# Patient Record
Sex: FEMALE | Race: AMERICAN INDIAN OR ALASKA NATIVE | NOT HISPANIC OR LATINO | Employment: UNEMPLOYED | URBAN - METROPOLITAN AREA
[De-identification: names, ages, dates, MRNs, and addresses within clinical notes are randomized per-mention and may not be internally consistent; named-entity substitution may affect disease eponyms.]

---

## 2019-02-19 PROBLEM — Z34.02 ENCOUNTER FOR SUPERVISION OF NORMAL FIRST PREGNANCY IN SECOND TRIMESTER: Status: ACTIVE | Noted: 2019-02-19

## 2019-02-19 PROBLEM — Z34.03 ENCOUNTER FOR SUPERVISION OF NORMAL FIRST PREGNANCY IN THIRD TRIMESTER: Status: ACTIVE | Noted: 2019-02-19

## 2019-06-25 PROBLEM — Z34.03 ENCOUNTER FOR SUPERVISION OF NORMAL FIRST PREGNANCY IN THIRD TRIMESTER: Status: RESOLVED | Noted: 2019-02-19 | Resolved: 2019-06-25

## 2020-02-12 ENCOUNTER — HOSPITAL ENCOUNTER (INPATIENT)
Facility: MEDICAL CENTER | Age: 25
LOS: 3 days | DRG: 419 | End: 2020-02-15
Attending: INTERNAL MEDICINE | Admitting: HOSPITALIST

## 2020-02-12 ENCOUNTER — HOSPITAL ENCOUNTER (OUTPATIENT)
Facility: MEDICAL CENTER | Age: 25
End: 2020-02-12
Admitting: INTERNAL MEDICINE

## 2020-02-12 ENCOUNTER — HOSPITAL ENCOUNTER (EMERGENCY)
Facility: MEDICAL CENTER | Age: 25
End: 2020-02-12

## 2020-02-12 VITALS — HEIGHT: 67 IN | BODY MASS INDEX: 25.64 KG/M2 | WEIGHT: 163.36 LBS

## 2020-02-12 DIAGNOSIS — K80.50 CHOLEDOCHOLITHIASIS: ICD-10-CM

## 2020-02-12 PROCEDURE — 770001 HCHG ROOM/CARE - MED/SURG/GYN PRIV*

## 2020-02-12 PROCEDURE — 99222 1ST HOSP IP/OBS MODERATE 55: CPT | Performed by: HOSPITALIST

## 2020-02-12 RX ORDER — HYDROMORPHONE HYDROCHLORIDE 1 MG/ML
0.5 INJECTION, SOLUTION INTRAMUSCULAR; INTRAVENOUS; SUBCUTANEOUS
Status: DISCONTINUED | OUTPATIENT
Start: 2020-02-12 | End: 2020-02-15 | Stop reason: HOSPADM

## 2020-02-12 RX ORDER — ONDANSETRON 2 MG/ML
4 INJECTION INTRAMUSCULAR; INTRAVENOUS EVERY 4 HOURS PRN
Status: DISCONTINUED | OUTPATIENT
Start: 2020-02-12 | End: 2020-02-15 | Stop reason: HOSPADM

## 2020-02-12 RX ORDER — BISACODYL 10 MG
10 SUPPOSITORY, RECTAL RECTAL
Status: DISCONTINUED | OUTPATIENT
Start: 2020-02-12 | End: 2020-02-15 | Stop reason: HOSPADM

## 2020-02-12 RX ORDER — PROMETHAZINE HYDROCHLORIDE 25 MG/1
12.5-25 SUPPOSITORY RECTAL EVERY 4 HOURS PRN
Status: DISCONTINUED | OUTPATIENT
Start: 2020-02-12 | End: 2020-02-15 | Stop reason: HOSPADM

## 2020-02-12 RX ORDER — ONDANSETRON 4 MG/1
4 TABLET, ORALLY DISINTEGRATING ORAL EVERY 4 HOURS PRN
Status: DISCONTINUED | OUTPATIENT
Start: 2020-02-12 | End: 2020-02-15 | Stop reason: HOSPADM

## 2020-02-12 RX ORDER — ACETAMINOPHEN 325 MG/1
650 TABLET ORAL EVERY 6 HOURS PRN
Status: DISCONTINUED | OUTPATIENT
Start: 2020-02-12 | End: 2020-02-15 | Stop reason: HOSPADM

## 2020-02-12 RX ORDER — ZOLPIDEM TARTRATE 5 MG/1
5 TABLET ORAL NIGHTLY PRN
Status: DISCONTINUED | OUTPATIENT
Start: 2020-02-12 | End: 2020-02-13 | Stop reason: HOSPADM

## 2020-02-12 RX ORDER — OXYCODONE HYDROCHLORIDE 10 MG/1
10 TABLET ORAL
Status: DISCONTINUED | OUTPATIENT
Start: 2020-02-12 | End: 2020-02-15 | Stop reason: HOSPADM

## 2020-02-12 RX ORDER — PROMETHAZINE HYDROCHLORIDE 25 MG/1
12.5-25 TABLET ORAL EVERY 4 HOURS PRN
Status: DISCONTINUED | OUTPATIENT
Start: 2020-02-12 | End: 2020-02-15 | Stop reason: HOSPADM

## 2020-02-12 RX ORDER — PROCHLORPERAZINE EDISYLATE 5 MG/ML
5-10 INJECTION INTRAMUSCULAR; INTRAVENOUS EVERY 4 HOURS PRN
Status: DISCONTINUED | OUTPATIENT
Start: 2020-02-12 | End: 2020-02-15 | Stop reason: HOSPADM

## 2020-02-12 RX ORDER — LEVOFLOXACIN 5 MG/ML
500 INJECTION, SOLUTION INTRAVENOUS
Status: DISCONTINUED | OUTPATIENT
Start: 2020-02-12 | End: 2020-02-13 | Stop reason: HOSPADM

## 2020-02-12 RX ORDER — ONDANSETRON 2 MG/ML
4 INJECTION INTRAMUSCULAR; INTRAVENOUS EVERY 4 HOURS PRN
Status: DISCONTINUED | OUTPATIENT
Start: 2020-02-12 | End: 2020-02-13 | Stop reason: HOSPADM

## 2020-02-12 RX ORDER — OXYCODONE HYDROCHLORIDE 5 MG/1
5 TABLET ORAL
Status: DISCONTINUED | OUTPATIENT
Start: 2020-02-12 | End: 2020-02-15 | Stop reason: HOSPADM

## 2020-02-12 RX ORDER — AMOXICILLIN 250 MG
2 CAPSULE ORAL 2 TIMES DAILY
Status: DISCONTINUED | OUTPATIENT
Start: 2020-02-12 | End: 2020-02-15 | Stop reason: HOSPADM

## 2020-02-12 RX ORDER — SODIUM CHLORIDE, SODIUM LACTATE, POTASSIUM CHLORIDE, CALCIUM CHLORIDE 600; 310; 30; 20 MG/100ML; MG/100ML; MG/100ML; MG/100ML
INJECTION, SOLUTION INTRAVENOUS CONTINUOUS
Status: DISCONTINUED | OUTPATIENT
Start: 2020-02-12 | End: 2020-02-15 | Stop reason: HOSPADM

## 2020-02-12 RX ORDER — FAMOTIDINE 20 MG/1
20 TABLET, FILM COATED ORAL
COMMUNITY
End: 2021-02-28

## 2020-02-12 RX ORDER — POLYETHYLENE GLYCOL 3350 17 G/17G
1 POWDER, FOR SOLUTION ORAL
Status: DISCONTINUED | OUTPATIENT
Start: 2020-02-12 | End: 2020-02-15 | Stop reason: HOSPADM

## 2020-02-12 RX ORDER — DEXTROSE MONOHYDRATE, SODIUM CHLORIDE, AND POTASSIUM CHLORIDE 50; 1.49; 4.5 G/1000ML; G/1000ML; G/1000ML
1000 INJECTION, SOLUTION INTRAVENOUS CONTINUOUS
Status: DISCONTINUED | OUTPATIENT
Start: 2020-02-12 | End: 2020-02-13 | Stop reason: HOSPADM

## 2020-02-12 ASSESSMENT — LIFESTYLE VARIABLES
EVER_SMOKED: NEVER
ALCOHOL_USE: NO
EVER FELT BAD OR GUILTY ABOUT YOUR DRINKING: NO
HOW MANY TIMES IN THE PAST YEAR HAVE YOU HAD 5 OR MORE DRINKS IN A DAY: 0
ON A TYPICAL DAY WHEN YOU DRINK ALCOHOL HOW MANY DRINKS DO YOU HAVE: 0
TOTAL SCORE: 0
HAVE YOU EVER FELT YOU SHOULD CUT DOWN ON YOUR DRINKING: NO
HAVE PEOPLE ANNOYED YOU BY CRITICIZING YOUR DRINKING: NO
TOTAL SCORE: 0
CONSUMPTION TOTAL: NEGATIVE
EVER HAD A DRINK FIRST THING IN THE MORNING TO STEADY YOUR NERVES TO GET RID OF A HANGOVER: NO
AVERAGE NUMBER OF DAYS PER WEEK YOU HAVE A DRINK CONTAINING ALCOHOL: 0
TOTAL SCORE: 0

## 2020-02-12 ASSESSMENT — COGNITIVE AND FUNCTIONAL STATUS - GENERAL
SUGGESTED CMS G CODE MODIFIER DAILY ACTIVITY: CH
SUGGESTED CMS G CODE MODIFIER MOBILITY: CH
DAILY ACTIVITIY SCORE: 24
MOBILITY SCORE: 24

## 2020-02-12 ASSESSMENT — PATIENT HEALTH QUESTIONNAIRE - PHQ9
SUM OF ALL RESPONSES TO PHQ9 QUESTIONS 1 AND 2: 0
1. LITTLE INTEREST OR PLEASURE IN DOING THINGS: NOT AT ALL
2. FEELING DOWN, DEPRESSED, IRRITABLE, OR HOPELESS: NOT AT ALL

## 2020-02-13 ENCOUNTER — HOSPITAL ENCOUNTER (OUTPATIENT)
Dept: RADIOLOGY | Facility: MEDICAL CENTER | Age: 25
End: 2020-02-13

## 2020-02-13 PROBLEM — K80.50 CHOLEDOCHOLITHIASIS: Status: ACTIVE | Noted: 2020-02-13

## 2020-02-13 LAB
ALBUMIN SERPL BCP-MCNC: 4.3 G/DL (ref 3.2–4.9)
ALBUMIN/GLOB SERPL: 1.3 G/DL
ALP SERPL-CCNC: 187 U/L (ref 30–99)
ALT SERPL-CCNC: 207 U/L (ref 2–50)
ANION GAP SERPL CALC-SCNC: 14 MMOL/L (ref 0–11.9)
AST SERPL-CCNC: 425 U/L (ref 12–45)
BASOPHILS # BLD AUTO: 0.1 % (ref 0–1.8)
BASOPHILS # BLD: 0.01 K/UL (ref 0–0.12)
BILIRUB SERPL-MCNC: 0.7 MG/DL (ref 0.1–1.5)
BUN SERPL-MCNC: 11 MG/DL (ref 8–22)
CALCIUM SERPL-MCNC: 9.2 MG/DL (ref 8.4–10.2)
CHLORIDE SERPL-SCNC: 104 MMOL/L (ref 96–112)
CO2 SERPL-SCNC: 20 MMOL/L (ref 20–33)
CREAT SERPL-MCNC: 0.49 MG/DL (ref 0.5–1.4)
EOSINOPHIL # BLD AUTO: 0.07 K/UL (ref 0–0.51)
EOSINOPHIL NFR BLD: 0.7 % (ref 0–6.9)
ERYTHROCYTE [DISTWIDTH] IN BLOOD BY AUTOMATED COUNT: 44.5 FL (ref 35.9–50)
GLOBULIN SER CALC-MCNC: 3.3 G/DL (ref 1.9–3.5)
GLUCOSE SERPL-MCNC: 86 MG/DL (ref 65–99)
HCT VFR BLD AUTO: 41.5 % (ref 37–47)
HGB BLD-MCNC: 13.2 G/DL (ref 12–16)
IMM GRANULOCYTES # BLD AUTO: 0.04 K/UL (ref 0–0.11)
IMM GRANULOCYTES NFR BLD AUTO: 0.4 % (ref 0–0.9)
LYMPHOCYTES # BLD AUTO: 1.62 K/UL (ref 1–4.8)
LYMPHOCYTES NFR BLD: 17.2 % (ref 22–41)
MCH RBC QN AUTO: 27 PG (ref 27–33)
MCHC RBC AUTO-ENTMCNC: 31.8 G/DL (ref 33.6–35)
MCV RBC AUTO: 84.9 FL (ref 81.4–97.8)
MONOCYTES # BLD AUTO: 1.07 K/UL (ref 0–0.85)
MONOCYTES NFR BLD AUTO: 11.3 % (ref 0–13.4)
NEUTROPHILS # BLD AUTO: 6.62 K/UL (ref 2–7.15)
NEUTROPHILS NFR BLD: 70.3 % (ref 44–72)
NRBC # BLD AUTO: 0 K/UL
NRBC BLD-RTO: 0 /100 WBC
PLATELET # BLD AUTO: 317 K/UL (ref 164–446)
PMV BLD AUTO: 10.1 FL (ref 9–12.9)
POTASSIUM SERPL-SCNC: 3.9 MMOL/L (ref 3.6–5.5)
PROT SERPL-MCNC: 7.6 G/DL (ref 6–8.2)
RBC # BLD AUTO: 4.89 M/UL (ref 4.2–5.4)
SODIUM SERPL-SCNC: 138 MMOL/L (ref 135–145)
WBC # BLD AUTO: 9.4 K/UL (ref 4.8–10.8)

## 2020-02-13 PROCEDURE — A9270 NON-COVERED ITEM OR SERVICE: HCPCS | Performed by: HOSPITALIST

## 2020-02-13 PROCEDURE — 36415 COLL VENOUS BLD VENIPUNCTURE: CPT

## 2020-02-13 PROCEDURE — 770001 HCHG ROOM/CARE - MED/SURG/GYN PRIV*

## 2020-02-13 PROCEDURE — 80053 COMPREHEN METABOLIC PANEL: CPT

## 2020-02-13 PROCEDURE — 700111 HCHG RX REV CODE 636 W/ 250 OVERRIDE (IP): Performed by: HOSPITALIST

## 2020-02-13 PROCEDURE — 700105 HCHG RX REV CODE 258: Performed by: HOSPITALIST

## 2020-02-13 PROCEDURE — 99233 SBSQ HOSP IP/OBS HIGH 50: CPT | Performed by: INTERNAL MEDICINE

## 2020-02-13 PROCEDURE — 700102 HCHG RX REV CODE 250 W/ 637 OVERRIDE(OP): Performed by: HOSPITALIST

## 2020-02-13 PROCEDURE — 85025 COMPLETE CBC W/AUTO DIFF WBC: CPT

## 2020-02-13 RX ORDER — KETOROLAC TROMETHAMINE 30 MG/ML
30 INJECTION, SOLUTION INTRAMUSCULAR; INTRAVENOUS EVERY 6 HOURS PRN
Status: DISCONTINUED | OUTPATIENT
Start: 2020-02-13 | End: 2020-02-14

## 2020-02-13 RX ADMIN — KETOROLAC TROMETHAMINE 30 MG: 30 INJECTION, SOLUTION INTRAMUSCULAR at 19:46

## 2020-02-13 RX ADMIN — SODIUM CHLORIDE, POTASSIUM CHLORIDE, SODIUM LACTATE AND CALCIUM CHLORIDE: 600; 310; 30; 20 INJECTION, SOLUTION INTRAVENOUS at 00:22

## 2020-02-13 RX ADMIN — SENNOSIDES AND DOCUSATE SODIUM 2 TABLET: 8.6; 5 TABLET ORAL at 17:03

## 2020-02-13 RX ADMIN — ACETAMINOPHEN 650 MG: 325 TABLET, FILM COATED ORAL at 18:01

## 2020-02-13 RX ADMIN — OXYCODONE HYDROCHLORIDE 10 MG: 10 TABLET ORAL at 21:24

## 2020-02-13 RX ADMIN — PROCHLORPERAZINE EDISYLATE 5 MG: 5 INJECTION INTRAMUSCULAR; INTRAVENOUS at 20:45

## 2020-02-13 ASSESSMENT — ENCOUNTER SYMPTOMS
SHORTNESS OF BREATH: 0
PSYCHIATRIC NEGATIVE: 1
DEPRESSION: 0
WEIGHT LOSS: 0
CONSTIPATION: 0
RESPIRATORY NEGATIVE: 1
HEADACHES: 0
DIZZINESS: 0
HEARTBURN: 0
COUGH: 0
BRUISES/BLEEDS EASILY: 0
ABDOMINAL PAIN: 1
WEAKNESS: 0
CONSTITUTIONAL NEGATIVE: 1
MYALGIAS: 0
HEARTBURN: 1
CARDIOVASCULAR NEGATIVE: 1
BACK PAIN: 0
VOMITING: 0
CLAUDICATION: 0
PHOTOPHOBIA: 0
CHILLS: 0
SORE THROAT: 0
FLANK PAIN: 0
MUSCULOSKELETAL NEGATIVE: 1
SPEECH CHANGE: 0
LOSS OF CONSCIOUSNESS: 0
BLURRED VISION: 0
NEUROLOGICAL NEGATIVE: 1
SENSORY CHANGE: 0
DIARRHEA: 0
NAUSEA: 0
NERVOUS/ANXIOUS: 0
INSOMNIA: 0
FEVER: 0

## 2020-02-13 NOTE — PROGRESS NOTES
Hospital Medicine Daily Progress Note    Date of Service  2/13/2020    Chief Complaint  24 y.o. female admitted 2/12/2020 with abdominal pain.    Hospital Course    [place] [facility] [place]CP showed choledocholithiasis.  She is still breast feeding her son who was born in May 2019.  She was sent to [facility] as [facility] [place]'t have ERCP available at their facility.  GI, Dr Duong and surgery- Dr Mackay have been consulted.      Interval Problem Update  Patient states she is feeling okay when examined, denied active pain and not requiring narcotics so she is continuing to breast feed her son at bedside.    Consultants/Specialty  GI - Rhoda  Surgery - Thompson    Code Status  full    Disposition  Home when appropriate.    Review of Systems  Review of Systems   Constitutional: Negative for chills and fever.   HENT: Negative for congestion and sore throat.    Eyes: Negative for blurred vision and photophobia.   Respiratory: Negative for cough and shortness of breath.    Cardiovascular: Negative for chest pain, claudication and leg swelling.   Gastrointestinal: Positive for abdominal pain (better). Negative for constipation, diarrhea, heartburn, nausea and vomiting.   Genitourinary: Negative for dysuria and hematuria.   Musculoskeletal: Negative for joint pain and myalgias.   Skin: Negative for itching and rash.   Neurological: Negative for dizziness, sensory change, speech change, weakness and headaches.   Psychiatric/Behavioral: Negative for depression. The patient is not nervous/anxious and does not have insomnia.         Physical Exam  Temp:  [37 °C (98.6 °F)] 37 °C (98.6 °F)  Pulse:  [83] 83  Resp:  [18] 18  BP: (128)/(81) 128/81  SpO2:  [96 %] 96 %    Physical Exam  Vitals signs and nursing note reviewed.   Constitutional:       General: She is not in acute distress.     Appearance: Normal appearance. She is not ill-appearing.   HENT:      Head: Normocephalic and atraumatic.      Nose: Nose normal.    Eyes:      General: No scleral icterus.  Neck:      Musculoskeletal: Neck supple.   Cardiovascular:      Rate and Rhythm: Normal rate and regular rhythm.      Heart sounds: Normal heart sounds. No murmur.   Pulmonary:      Effort: Pulmonary effort is normal.      Breath sounds: Normal breath sounds.   Abdominal:      General: Bowel sounds are normal. There is no distension.      Palpations: Abdomen is soft.   Musculoskeletal:         General: No swelling or tenderness.   Skin:     General: Skin is warm and dry.   Neurological:      General: No focal deficit present.      Mental Status: She is alert and oriented to person, place, and time.   Psychiatric:         Mood and Affect: Mood normal.         Fluids  No intake or output data in the 24 hours ending 02/13/20 1158    Laboratory  Recent Labs     02/12/20  1355 02/13/20  0225   WBC 8.8 9.4   RBC 5.20 4.89   HEMOGLOBIN 14.0 13.2   HEMATOCRIT 43.3 41.5   MCV 83.3 84.9   MCH 26.9* 27.0   MCHC 32.3* 31.8*   RDW 14.6* 44.5   PLATELETCT 356 317   MPV 9.8 10.1     Recent Labs     02/12/20  1355 02/13/20  0225   SODIUM 142 138   POTASSIUM 3.5 3.9   CHLORIDE 105 104   CO2 22 20   GLUCOSE 115* 86   BUN 15 11   CREATININE 0.8 0.49*   CALCIUM 9.6 9.2                   Imaging  OUTSIDE IMAGES-DX CHEST   Final Result      CT-FOREIGN FILM CAT SCAN   Final Result      US-FOREIGN FILM ULTRASOUND   Final Result      MR-FOREIGN FILM MRI   Final Result           Assessment/Plan  Patient is a currently breast-feeding mother  Assessment & Plan  Breast pump being ordered for patient.    Choledocholithiasis- (present on admission)  Assessment & Plan  Admit for GI evaluation, ERCP, Dr. Duong consulted.  Surgery has already been consulted Dr. Mackay will follow the patient.  Patient is breast-feeding,  I will order Toradol for pain unless severe.  She is written for narcotics advised her not to breast-feed after getting narcotics.  N.p.o. after midnight  Lactated Ringer's infusion at  75 cc/h.  She does not have evidence of cholecystitis therefore I am not giving her antibiotics.         VTE prophylaxis: juan antonios

## 2020-02-13 NOTE — CARE PLAN
Problem: Communication  Goal: The ability to communicate needs accurately and effectively will improve  Outcome: PROGRESSING AS EXPECTED     Problem: Safety  Goal: Will remain free from injury  Outcome: PROGRESSING AS EXPECTED     Problem: Bowel/Gastric:  Goal: Normal bowel function is maintained or improved  Outcome: PROGRESSING AS EXPECTED     Problem: Knowledge Deficit  Goal: Knowledge of disease process/condition, treatment plan, diagnostic tests, and medications will improve  Outcome: PROGRESSING AS EXPECTED

## 2020-02-13 NOTE — H&P
Hospital Medicine History & Physical Note    Date of Service  2/12/2020    Primary Care Physician  Pcp Pt States None    Consultants  GI, Dr. Duong  Surgery, Dr. Mackay    Code Status  Full code.    Chief Complaint  Upper abdominal.    History of Presenting Illness  24 y.o. female who presented 2/12/2020 with upper abdominal and chest pain.  [place] [facility] [place] [place] it was acute onset while she was driving.  The pain was absolutely severe and went through to her back.  Looking at her medical record, since her delivery of her son in May 2019 she has had 2 ER visits prior to this 1 with abdominal pain and vomiting, gallstones were seen but no cholecystitis and no obstruction.  Today she was very nauseated.  She went to [place] where they found that she had choledocholithiasis.  They do not have services for ERCP there and therefore she is transferred here for GI evaluation.    Review of Systems  Review of Systems   Constitutional: Negative.  Negative for chills, fever, malaise/fatigue and weight loss.   HENT: Negative.    Respiratory: Negative.  Negative for cough and shortness of breath.    Cardiovascular: Negative.  Negative for chest pain and leg swelling.   Gastrointestinal: Positive for abdominal pain. Negative for nausea and vomiting.   Genitourinary: Negative.  Negative for dysuria and flank pain.   Musculoskeletal: Negative.  Negative for back pain and myalgias.   Neurological: Negative.  Negative for dizziness, loss of consciousness and weakness.   Endo/Heme/Allergies: Negative.  Does not bruise/bleed easily.   Psychiatric/Behavioral: Negative.  Negative for depression. The patient is not nervous/anxious.    All other systems reviewed and are negative.      Past Medical History   has a past medical history of Scleroderma (HCC) (4/26/2013).    Surgical History  NOne     Family History  family history includes Arthritis in her mother. Her mother and aunt  both had gallstones.    Social History   reports that she has never smoked. She has never used smokeless tobacco. She reports that she does not drink alcohol or use drugs.    Allergies  No Known Allergies    Medications  None       Physical Exam  Temp:  [37 °C (98.6 °F)] 37 °C (98.6 °F)  Pulse:  [83] 83  Resp:  [18] 18  BP: (128)/(81) 128/81  SpO2:  [96 %] 96 %    Physical Exam  Vitals signs and nursing note reviewed.   Constitutional:       General: She is not in acute distress.     Appearance: She is well-developed. She is not diaphoretic.   HENT:      Right Ear: External ear normal.      Left Ear: External ear normal.      Nose: Nose normal.      Mouth/Throat:      Pharynx: No oropharyngeal exudate.   Eyes:      General: No scleral icterus.        Right eye: No discharge.         Left eye: No discharge.      Conjunctiva/sclera: Conjunctivae normal.   Neck:      Vascular: No JVD.      Trachea: No tracheal deviation.   Cardiovascular:      Rate and Rhythm: Normal rate and regular rhythm.      Heart sounds: Normal heart sounds.   Pulmonary:      Effort: Pulmonary effort is normal. No respiratory distress.      Breath sounds: Normal breath sounds. No stridor. No wheezing or rales.   Chest:      Chest wall: No tenderness.   Abdominal:      General: Bowel sounds are normal. There is no distension.      Palpations: Abdomen is soft.      Tenderness: There is abdominal tenderness (RUQ).   Musculoskeletal:         General: No tenderness.   Skin:     General: Skin is warm and dry.      Coloration: Skin is not pale.   Neurological:      General: No focal deficit present.      Mental Status: She is alert and oriented to person, place, and time.      Cranial Nerves: No cranial nerve deficit.      Motor: No abnormal muscle tone.   Psychiatric:         Mood and Affect: Mood normal.         Behavior: Behavior normal.         Thought Content: Thought content normal.         Judgment: Judgment normal.         Laboratory:  Recent  Labs     02/12/20  1355   WBC 8.8   RBC 5.20   HEMOGLOBIN 14.0   HEMATOCRIT 43.3   MCV 83.3   MCH 26.9*   MCHC 32.3*   RDW 14.6*   PLATELETCT 356   MPV 9.8     Recent Labs     02/12/20  1355   SODIUM 142   POTASSIUM 3.5   CHLORIDE 105   CO2 22   GLUCOSE 115*   BUN 15   CREATININE 0.8   CALCIUM 9.6     Recent Labs     02/12/20  1355   ALTSGPT 31   ASTSGOT 66*   ALKPHOSPHAT 128*   TBILIRUBIN 0.5   DBILIRUBIN <0.2   LIPASE 80   GLUCOSE 115*         No results for input(s): NTPROBNP in the last 72 hours.      No results for input(s): TROPONINT in the last 72 hours.    Urinalysis:    No results found     Imaging:  No orders to display         Assessment/Plan:  I anticipate this patient will require at least two midnights for appropriate medical management, necessitating inpatient admission.    Choledocholithiasis- (present on admission)  Assessment & Plan  Admit for GI evaluation, ERCP, Dr. Duong consulted.  Surgery has already been consulted Dr. Mackay will follow the patient.  Patient is breast-feeding,  I will order Toradol for pain unless severe.  She is written for narcotics advised her not to breast-feed after getting narcotics.  N.p.o. after midnight  Lactated Ringer's infusion at 75 cc/h.  She does not have evidence of cholecystitis therefore I am not giving her antibiotics.      VTE prophylaxis: SCDs.

## 2020-02-13 NOTE — ASSESSMENT & PLAN NOTE
Admit for GI evaluation, ERCP today, Dr. Lazar consulted.  Surgery had already been consulted Dr. Mackay saw the patient in [place] but since she was transferred to [place], will need to discuss with a local surgeon, Dr Regalado will consult.  Patient is breast-feeding,  Toradol for pain unless severe.  She is written for narcotics advised her not to breast-feed after getting narcotics.  Lactated Ringer's infusion at 75 cc/h.  She does not have evidence of cholecystitis therefore I am not giving her antibiotics.

## 2020-02-13 NOTE — PROGRESS NOTES
0700: Bedside report from Ruben MOHR RN. Pt wakes to voice. No needs at this time. Pending possible, pt breatsfeeding will order  Breastpump.

## 2020-02-13 NOTE — PROGRESS NOTES
Received ED to [place]patient [place] [facility]   Sending Physician: Trinity   Specialist consulted: Rhoda   Diagnosis Common Bile duct stone   Patient Accepted by: Dr. John     Patient coming via: POV  ETA: TBD   Nursing to notify Direct Admit On-Call hospitalist when patient arrives

## 2020-02-14 ENCOUNTER — APPOINTMENT (OUTPATIENT)
Dept: RADIOLOGY | Facility: MEDICAL CENTER | Age: 25
DRG: 419 | End: 2020-02-14
Attending: INTERNAL MEDICINE

## 2020-02-14 ENCOUNTER — ANESTHESIA EVENT (OUTPATIENT)
Dept: SURGERY | Facility: MEDICAL CENTER | Age: 25
DRG: 419 | End: 2020-02-14

## 2020-02-14 ENCOUNTER — ANESTHESIA (OUTPATIENT)
Dept: SURGERY | Facility: MEDICAL CENTER | Age: 25
DRG: 419 | End: 2020-02-14

## 2020-02-14 PROBLEM — R17 ELEVATED BILIRUBIN: Status: ACTIVE | Noted: 2020-02-14

## 2020-02-14 PROBLEM — R74.01 ELEVATED TRANSAMINASE LEVEL: Status: ACTIVE | Noted: 2020-02-14

## 2020-02-14 LAB
ALBUMIN SERPL BCP-MCNC: 4 G/DL (ref 3.2–4.9)
ALBUMIN/GLOB SERPL: 1.3 G/DL
ALP SERPL-CCNC: 220 U/L (ref 30–99)
ALT SERPL-CCNC: 212 U/L (ref 2–50)
ANION GAP SERPL CALC-SCNC: 13 MMOL/L (ref 0–11.9)
AST SERPL-CCNC: 322 U/L (ref 12–45)
BASOPHILS # BLD AUTO: 0.3 % (ref 0–1.8)
BASOPHILS # BLD: 0.02 K/UL (ref 0–0.12)
BILIRUB SERPL-MCNC: 1.8 MG/DL (ref 0.1–1.5)
BUN SERPL-MCNC: 11 MG/DL (ref 8–22)
CALCIUM SERPL-MCNC: 9.2 MG/DL (ref 8.4–10.2)
CHLORIDE SERPL-SCNC: 106 MMOL/L (ref 96–112)
CO2 SERPL-SCNC: 22 MMOL/L (ref 20–33)
CREAT SERPL-MCNC: 0.58 MG/DL (ref 0.5–1.4)
EOSINOPHIL # BLD AUTO: 0.07 K/UL (ref 0–0.51)
EOSINOPHIL NFR BLD: 0.9 % (ref 0–6.9)
ERYTHROCYTE [DISTWIDTH] IN BLOOD BY AUTOMATED COUNT: 45.2 FL (ref 35.9–50)
GLOBULIN SER CALC-MCNC: 3 G/DL (ref 1.9–3.5)
GLUCOSE SERPL-MCNC: 123 MG/DL (ref 65–99)
HCT VFR BLD AUTO: 39.3 % (ref 37–47)
HGB BLD-MCNC: 12.3 G/DL (ref 12–16)
IMM GRANULOCYTES # BLD AUTO: 0.03 K/UL (ref 0–0.11)
IMM GRANULOCYTES NFR BLD AUTO: 0.4 % (ref 0–0.9)
LYMPHOCYTES # BLD AUTO: 1.31 K/UL (ref 1–4.8)
LYMPHOCYTES NFR BLD: 17.1 % (ref 22–41)
MCH RBC QN AUTO: 26.9 PG (ref 27–33)
MCHC RBC AUTO-ENTMCNC: 31.3 G/DL (ref 33.6–35)
MCV RBC AUTO: 85.8 FL (ref 81.4–97.8)
MONOCYTES # BLD AUTO: 0.86 K/UL (ref 0–0.85)
MONOCYTES NFR BLD AUTO: 11.3 % (ref 0–13.4)
NEUTROPHILS # BLD AUTO: 5.35 K/UL (ref 2–7.15)
NEUTROPHILS NFR BLD: 70 % (ref 44–72)
NRBC # BLD AUTO: 0 K/UL
NRBC BLD-RTO: 0 /100 WBC
PLATELET # BLD AUTO: 318 K/UL (ref 164–446)
PMV BLD AUTO: 9.9 FL (ref 9–12.9)
POTASSIUM SERPL-SCNC: 4.2 MMOL/L (ref 3.6–5.5)
PROT SERPL-MCNC: 7 G/DL (ref 6–8.2)
RBC # BLD AUTO: 4.58 M/UL (ref 4.2–5.4)
SODIUM SERPL-SCNC: 141 MMOL/L (ref 135–145)
WBC # BLD AUTO: 7.6 K/UL (ref 4.8–10.8)

## 2020-02-14 PROCEDURE — 160035 HCHG PACU - 1ST 60 MINS PHASE I: Performed by: INTERNAL MEDICINE

## 2020-02-14 PROCEDURE — 160208 HCHG ENDO MINUTES - EA ADDL 1 MIN LEVEL 4: Performed by: INTERNAL MEDICINE

## 2020-02-14 PROCEDURE — BF101ZZ FLUOROSCOPY OF BILE DUCTS USING LOW OSMOLAR CONTRAST: ICD-10-PCS | Performed by: INTERNAL MEDICINE

## 2020-02-14 PROCEDURE — C1769 GUIDE WIRE: HCPCS | Performed by: INTERNAL MEDICINE

## 2020-02-14 PROCEDURE — 85025 COMPLETE CBC W/AUTO DIFF WBC: CPT

## 2020-02-14 PROCEDURE — 160203 HCHG ENDO MINUTES - 1ST 30 MINS LEVEL 4: Performed by: INTERNAL MEDICINE

## 2020-02-14 PROCEDURE — 36415 COLL VENOUS BLD VENIPUNCTURE: CPT

## 2020-02-14 PROCEDURE — 700111 HCHG RX REV CODE 636 W/ 250 OVERRIDE (IP): Performed by: ANESTHESIOLOGY

## 2020-02-14 PROCEDURE — 770001 HCHG ROOM/CARE - MED/SURG/GYN PRIV*

## 2020-02-14 PROCEDURE — 160048 HCHG OR STATISTICAL LEVEL 1-5: Performed by: INTERNAL MEDICINE

## 2020-02-14 PROCEDURE — 160002 HCHG RECOVERY MINUTES (STAT): Performed by: INTERNAL MEDICINE

## 2020-02-14 PROCEDURE — 80053 COMPREHEN METABOLIC PANEL: CPT

## 2020-02-14 PROCEDURE — A9270 NON-COVERED ITEM OR SERVICE: HCPCS | Performed by: HOSPITALIST

## 2020-02-14 PROCEDURE — 74328 X-RAY BILE DUCT ENDOSCOPY: CPT

## 2020-02-14 PROCEDURE — 700101 HCHG RX REV CODE 250: Performed by: ANESTHESIOLOGY

## 2020-02-14 PROCEDURE — 110371 HCHG SHELL REV 272: Performed by: INTERNAL MEDICINE

## 2020-02-14 PROCEDURE — 700102 HCHG RX REV CODE 250 W/ 637 OVERRIDE(OP): Performed by: HOSPITALIST

## 2020-02-14 PROCEDURE — 500066 HCHG BITE BLOCK, ECT: Performed by: INTERNAL MEDICINE

## 2020-02-14 PROCEDURE — 99232 SBSQ HOSP IP/OBS MODERATE 35: CPT | Performed by: INTERNAL MEDICINE

## 2020-02-14 PROCEDURE — 160009 HCHG ANES TIME/MIN: Performed by: INTERNAL MEDICINE

## 2020-02-14 PROCEDURE — 0FC98ZZ EXTIRPATION OF MATTER FROM COMMON BILE DUCT, VIA NATURAL OR ARTIFICIAL OPENING ENDOSCOPIC: ICD-10-PCS | Performed by: INTERNAL MEDICINE

## 2020-02-14 RX ORDER — HALOPERIDOL 5 MG/ML
1 INJECTION INTRAMUSCULAR
Status: DISCONTINUED | OUTPATIENT
Start: 2020-02-14 | End: 2020-02-14 | Stop reason: HOSPADM

## 2020-02-14 RX ORDER — ONDANSETRON 2 MG/ML
4 INJECTION INTRAMUSCULAR; INTRAVENOUS
Status: DISCONTINUED | OUTPATIENT
Start: 2020-02-14 | End: 2020-02-14 | Stop reason: HOSPADM

## 2020-02-14 RX ORDER — DIPHENHYDRAMINE HYDROCHLORIDE 50 MG/ML
12.5 INJECTION INTRAMUSCULAR; INTRAVENOUS
Status: DISCONTINUED | OUTPATIENT
Start: 2020-02-14 | End: 2020-02-14 | Stop reason: HOSPADM

## 2020-02-14 RX ORDER — MIDAZOLAM HYDROCHLORIDE 1 MG/ML
INJECTION INTRAMUSCULAR; INTRAVENOUS PRN
Status: DISCONTINUED | OUTPATIENT
Start: 2020-02-14 | End: 2020-02-14 | Stop reason: SURG

## 2020-02-14 RX ORDER — HYDROMORPHONE HYDROCHLORIDE 1 MG/ML
0.2 INJECTION, SOLUTION INTRAMUSCULAR; INTRAVENOUS; SUBCUTANEOUS
Status: DISCONTINUED | OUTPATIENT
Start: 2020-02-14 | End: 2020-02-14 | Stop reason: HOSPADM

## 2020-02-14 RX ORDER — LIDOCAINE HYDROCHLORIDE 20 MG/ML
INJECTION, SOLUTION EPIDURAL; INFILTRATION; INTRACAUDAL; PERINEURAL PRN
Status: DISCONTINUED | OUTPATIENT
Start: 2020-02-14 | End: 2020-02-14 | Stop reason: SURG

## 2020-02-14 RX ORDER — OXYCODONE HCL 5 MG/5 ML
10 SOLUTION, ORAL ORAL
Status: DISCONTINUED | OUTPATIENT
Start: 2020-02-14 | End: 2020-02-14 | Stop reason: HOSPADM

## 2020-02-14 RX ORDER — HYDROMORPHONE HYDROCHLORIDE 1 MG/ML
0.1 INJECTION, SOLUTION INTRAMUSCULAR; INTRAVENOUS; SUBCUTANEOUS
Status: DISCONTINUED | OUTPATIENT
Start: 2020-02-14 | End: 2020-02-14 | Stop reason: HOSPADM

## 2020-02-14 RX ORDER — ONDANSETRON 2 MG/ML
INJECTION INTRAMUSCULAR; INTRAVENOUS PRN
Status: DISCONTINUED | OUTPATIENT
Start: 2020-02-14 | End: 2020-02-14 | Stop reason: SURG

## 2020-02-14 RX ORDER — OXYCODONE HCL 5 MG/5 ML
5 SOLUTION, ORAL ORAL
Status: DISCONTINUED | OUTPATIENT
Start: 2020-02-14 | End: 2020-02-14 | Stop reason: HOSPADM

## 2020-02-14 RX ORDER — SUCCINYLCHOLINE/SOD CL,ISO/PF 200MG/10ML
SYRINGE (ML) INTRAVENOUS PRN
Status: DISCONTINUED | OUTPATIENT
Start: 2020-02-14 | End: 2020-02-14 | Stop reason: SURG

## 2020-02-14 RX ORDER — DEXAMETHASONE SODIUM PHOSPHATE 4 MG/ML
INJECTION, SOLUTION INTRA-ARTICULAR; INTRALESIONAL; INTRAMUSCULAR; INTRAVENOUS; SOFT TISSUE PRN
Status: DISCONTINUED | OUTPATIENT
Start: 2020-02-14 | End: 2020-02-14 | Stop reason: SURG

## 2020-02-14 RX ORDER — HYDROMORPHONE HYDROCHLORIDE 1 MG/ML
0.4 INJECTION, SOLUTION INTRAMUSCULAR; INTRAVENOUS; SUBCUTANEOUS
Status: DISCONTINUED | OUTPATIENT
Start: 2020-02-14 | End: 2020-02-14 | Stop reason: HOSPADM

## 2020-02-14 RX ADMIN — ONDANSETRON 4 MG: 2 INJECTION INTRAMUSCULAR; INTRAVENOUS at 11:00

## 2020-02-14 RX ADMIN — LIDOCAINE HYDROCHLORIDE 100 MG: 20 INJECTION, SOLUTION EPIDURAL; INFILTRATION; INTRACAUDAL; PERINEURAL at 10:55

## 2020-02-14 RX ADMIN — FENTANYL CITRATE 100 MCG: 50 INJECTION, SOLUTION INTRAMUSCULAR; INTRAVENOUS at 10:55

## 2020-02-14 RX ADMIN — MIDAZOLAM HYDROCHLORIDE 2 MG: 1 INJECTION, SOLUTION INTRAMUSCULAR; INTRAVENOUS at 10:52

## 2020-02-14 RX ADMIN — PROPOFOL 150 MG: 10 INJECTION, EMULSION INTRAVENOUS at 10:55

## 2020-02-14 RX ADMIN — Medication 100 MG: at 10:55

## 2020-02-14 RX ADMIN — SENNOSIDES AND DOCUSATE SODIUM 2 TABLET: 8.6; 5 TABLET ORAL at 17:34

## 2020-02-14 RX ADMIN — DEXAMETHASONE SODIUM PHOSPHATE 4 MG: 4 INJECTION, SOLUTION INTRAMUSCULAR; INTRAVENOUS at 11:00

## 2020-02-14 ASSESSMENT — ENCOUNTER SYMPTOMS
COUGH: 0
DIZZINESS: 0
PHOTOPHOBIA: 0
VOMITING: 0
MYALGIAS: 0
DIARRHEA: 0
BLURRED VISION: 0
NAUSEA: 0
CHILLS: 0
ABDOMINAL PAIN: 1
SHORTNESS OF BREATH: 0
SPEECH CHANGE: 0
INSOMNIA: 0
WEAKNESS: 0
NERVOUS/ANXIOUS: 0
CONSTIPATION: 0
DEPRESSION: 0
CLAUDICATION: 0
SORE THROAT: 0
HEADACHES: 0
SENSORY CHANGE: 0
FEVER: 0
HEARTBURN: 0

## 2020-02-14 NOTE — ANESTHESIA TIME REPORT
Anesthesia Start and Stop Event Times     Date Time Event    2/14/2020 1033 Ready for Procedure     1052 Anesthesia Start     1141 Anesthesia Stop        Responsible Staff  02/14/20    Name Role Begin End    Cayden Lock M.D. Anesth 1052 1141        Preop Diagnosis (Free Text):  Pre-op Diagnosis     choledocholithiasis        Preop Diagnosis (Codes):  Diagnosis Information     Diagnosis Code(s): Choledocholithiasis [K80.50]        Post op Diagnosis  Choledocholithiasis      Premium Reason  G. Contracted, Vacation    Comments: PM vacation at 11am.

## 2020-02-14 NOTE — PROGRESS NOTES
0700: Bedside report from Ruben MOHR RN. Pt wakes to voice. No needs at this time. Pending ERCP at 1000.

## 2020-02-14 NOTE — ANESTHESIA PREPROCEDURE EVALUATION
Relevant Problems   No relevant active problems       Physical Exam    Airway   Mallampati: II  TM distance: >3 FB  Neck ROM: full       Cardiovascular - normal exam  Rhythm: regular  Rate: normal  (-) murmur     Dental - normal exam           Pulmonary - normal exam  Breath sounds clear to auscultation     Abdominal    Neurological - normal exam                 Anesthesia Plan    ASA 1       Plan - general       Airway plan will be ETT        Induction: intravenous    Postoperative Plan: Postoperative administration of opioids is intended.    Pertinent diagnostic labs and testing reviewed    Informed Consent:    Anesthetic plan and risks discussed with patient.    Use of blood products discussed with: patient whom consented to blood products.

## 2020-02-14 NOTE — PROGRESS NOTES
Pt updated on POC for tomorrow for ERCP, ok to have diet until midnight tonight, then NPO at 0000.

## 2020-02-14 NOTE — OR NURSING
1139- Patient arrived from [facility]. Respirations spontaneous and unlabored. VSS, see flowsheets. Abdomen soft. Normoactive bowel sounds.   1155- Patient more awake. Declines any pain or nausea.   1234- No change in surgical assessment. Patient meets criteria to transfer to floor.

## 2020-02-14 NOTE — CONSULTS
Date of service: 2/13/2020    Attending Physician: Khalida Segura D.O.    History of Present Illness: Gricel Restrepo is a 24 y.o. female here for chest pain.    24 year old complained of chest pain for a few days. She was worked up with to chest CT to rule out a pulmonary embolus because of a positive dimer and another one yesterday to rule out a dissecting aneurysm.    Subsequently she was found to have abnomal LFTS and cholelithiasis. Ultimately a MRCP was done revealing a distal CBD stone and mild dilation.    She is not toxic appearing and does currently not have pain. Her  and parent s and baby are all present. She gave birth about 9 months ago ad is currently breastfeeding.    She otherwise denies significant medical issues.    Denies current nausea, vomiting, or abdominal pain.    She has been treated with Pepcid for presumed GERD since the end of the pregnancy with sub optimal relief. Not sure if she has been passing stones all along.  Review of Systems:     Review of Systems   Constitutional: Negative.    HENT: Negative.    Respiratory: Negative.    Cardiovascular: Negative.    Gastrointestinal: Positive for abdominal pain and heartburn.   Genitourinary: Negative.    Musculoskeletal: Negative.    Skin: Negative.    Otherwise negative for all other systems.    Current Facility-Administered Medications   Medication Dose Route Frequency Provider Last Rate Last Dose   • ketorolac (TORADOL) injection 30 mg  30 mg Intravenous Q6HRS PRN Liz Gamble M.D.       • senna-docusate (PERICOLACE or SENOKOT S) 8.6-50 MG per tablet 2 Tab  2 Tab Oral BID Liz Gamble M.D.   2 Tab at 02/13/20 1703    And   • polyethylene glycol/lytes (MIRALAX) PACKET 1 Packet  1 Packet Oral QDAY PRN Liz Gamble M.D.        And   • magnesium hydroxide (MILK OF MAGNESIA) suspension 30 mL  30 mL Oral QDAY PREDD Gamble M.D.        And   • bisacodyl (DULCOLAX) suppository 10 mg  10 mg Rectal QDAY PREDD Gamble M.D.   "     • lactated ringers infusion   Intravenous Continuous Liz Gamble M.D. 75 mL/hr at 02/13/20 0022     • acetaminophen (TYLENOL) tablet 650 mg  650 mg Oral Q6HRS PREDD Gamble M.D.       • Pharmacy Consult Request ...Pain Management Review 1 Each  1 Each Other PHARMACY TO DOSE Liz Gamble M.D.        And   • oxyCODONE immediate-release (ROXICODONE) tablet 5 mg  5 mg Oral Q3HRS PREDD Gamble M.D.        And   • oxyCODONE immediate release (ROXICODONE) tablet 10 mg  10 mg Oral Q3HRS PREDD Gamble M.D.        And   • HYDROmorphone pf (DILAUDID) injection 0.5 mg  0.5 mg Intravenous Q3HRS PREDD Gamble M.D.       • ondansetron (ZOFRAN) syringe/vial injection 4 mg  4 mg Intravenous Q4HRS PREDD Gamble M.D.       • ondansetron (ZOFRAN ODT) dispertab 4 mg  4 mg Oral Q4HRS PREDD Gamble M.D.       • promethazine (PHENERGAN) tablet 12.5-25 mg  12.5-25 mg Oral Q4HRS PREDD Gamble M.D.       • promethazine (PHENERGAN) suppository 12.5-25 mg  12.5-25 mg Rectal Q4HRS PREDD Gamble M.D.       • prochlorperazine (COMPAZINE) injection 5-10 mg  5-10 mg Intravenous Q4HRS RORY Gamble M.D.           Social History     Tobacco Use   • Smoking status: Never Smoker   • Smokeless tobacco: Never Used   Substance Use Topics   • Alcohol use: No   • Drug use: No     Types: Marijuana        Past Medical History:   Diagnosis Date   • Scleroderma (HCC) 4/26/2013       No past surgical history on file.    Allergies: Patient has no known allergies.    Family History   Problem Relation Age of Onset   • Arthritis Mother        Vitals:    02/12/20 2222 02/13/20 1315   Height: 1.702 m (5' 7.01\")    Weight: 74.1 kg (163 lb 5.8 oz)    Weight % change since last entry.: 0 %    BP: 128/81 114/60   Pulse: 83 74   BMI (Calculated): 25.58    Resp: 18 18   Temp: 37 °C (98.6 °F) 36.7 °C (98 °F)   TempSrc: Oral Oral       Physical Examination:   Physical Exam   Constitutional: She is oriented to person, " place, and time and well-developed, well-nourished, and in no distress.   HENT:   Head: Normocephalic and atraumatic.   Eyes: No scleral icterus.   Neck: No tracheal deviation present.   Cardiovascular: Normal rate.   Pulmonary/Chest: Effort normal. No stridor. No respiratory distress.   Abdominal: Soft. She exhibits no distension. There is no abdominal tenderness.   Musculoskeletal: Normal range of motion.   Neurological: She is alert and oriented to person, place, and time.   Skin: Skin is warm and dry.   Psychiatric: Affect and judgment normal.         No results found for: PROTHROMBTM, INR   Lab Results   Component Value Date/Time    WBC 9.4 02/13/2020 02:25 AM    RBC 4.89 02/13/2020 02:25 AM    HEMOGLOBIN 13.2 02/13/2020 02:25 AM    HEMATOCRIT 41.5 02/13/2020 02:25 AM    MCV 84.9 02/13/2020 02:25 AM    MCH 27.0 02/13/2020 02:25 AM    MCHC 31.8 (L) 02/13/2020 02:25 AM    MPV 10.1 02/13/2020 02:25 AM    NEUTSPOLYS 70.30 02/13/2020 02:25 AM    LYMPHOCYTES 17.20 (L) 02/13/2020 02:25 AM    MONOCYTES 11.30 02/13/2020 02:25 AM    EOSINOPHILS 0.70 02/13/2020 02:25 AM    BASOPHILS 0.10 02/13/2020 02:25 AM      Lab Results   Component Value Date/Time    SODIUM 138 02/13/2020 02:25 AM    POTASSIUM 3.9 02/13/2020 02:25 AM    CHLORIDE 104 02/13/2020 02:25 AM    CO2 20 02/13/2020 02:25 AM    GLUCOSE 86 02/13/2020 02:25 AM    BUN 11 02/13/2020 02:25 AM    CREATININE 0.49 (L) 02/13/2020 02:25 AM      Recent Labs     02/12/20  1355 02/13/20  0225   ASTSGOT 66* 425*   ALTSGPT 31 207*   TBILIRUBIN 0.5 0.7   IBILIRUBIN 0.3  --    DBILIRUBIN <0.2  --    ALKPHOSPHAT 128* 187*   GLOBULIN  --  3.3       Imaging:   OUTSIDE IMAGES-DX CHEST   Final Result      CT-FOREIGN FILM CAT SCAN   Final Result      US-FOREIGN FILM ULTRASOUND   Final Result      MR-FOREIGN FILM MRI   Final Result              Assessment and Plan:    Choledocholithiasis  Cholelithiasis  Chest pain   Abnormal LFTs      REC:  ERCP at 10 AM tomorrow  Hold on  antibiotics

## 2020-02-14 NOTE — PROGRESS NOTES
Hospital Medicine Daily Progress Note    Date of Service  2/14/2020    Chief Complaint  24 y.o. female admitted 2/12/2020 with abdominal pain.    Hospital Course    Patient transferred from [facility] after MRCP showed choledocholithiasis.  She is still breast feeding her son who was born in May 2019.  She was sent to [facility] as [facility] doesn't have ERCP available at their facility.  Patient seen by GI - Dr Lazar and Dr Mackay saw patient when in [place] but she needs surgeon here in [place], I consulted Dr Regalado.      Interval Problem Update  2/13 Patient states she is feeling okay when examined, denied active pain and not requiring narcotics so she is continuing to breast feed her son at bedside.  2/14 Patient seen briefly before she went to ERCP, states pain is better and needing minimal amount of pain medication.  She has taken one dose of toradol and one dose of oxycodone last night.  Dr Regalado has agreed to consult later today and likely to OR tomorrow.    Consultants/Specialty  GI - [facility]  Surgery - [facility]    Code Status  full    Disposition  Home when appropriate.    Review of Systems  Review of Systems   Constitutional: Negative for chills and fever.   HENT: Negative for congestion and sore throat.    Eyes: Negative for blurred vision and photophobia.   Respiratory: Negative for cough and shortness of breath.    Cardiovascular: Negative for chest pain, claudication and leg swelling.   Gastrointestinal: Positive for abdominal pain (better). Negative for constipation, diarrhea, heartburn, nausea and vomiting.   Genitourinary: Negative for dysuria and hematuria.   Musculoskeletal: Negative for joint pain and myalgias.   Skin: Negative for itching and rash.   Neurological: Negative for dizziness, sensory change, speech change, weakness and headaches.   Psychiatric/Behavioral: Negative for depression. The patient is not nervous/anxious and does not have insomnia.         Physical Exam  Temp:  [36.7 °C (98 °F)-37 °C  (98.6 °F)] 36.7 °C (98.1 °F)  Pulse:  [] 102  Resp:  [16-18] 16  BP: (104-129)/(49-83) 117/64  SpO2:  [95 %-97 %] 95 %    Physical Exam  Vitals signs and nursing note reviewed.   Constitutional:       General: She is not in acute distress.     Appearance: Normal appearance. She is not ill-appearing.   HENT:      Head: Normocephalic and atraumatic.      Nose: Nose normal.   Eyes:      General: No scleral icterus.  Neck:      Musculoskeletal: Neck supple.   Cardiovascular:      Rate and Rhythm: Normal rate and regular rhythm.      Heart sounds: Normal heart sounds. No murmur.   Pulmonary:      Effort: Pulmonary effort is normal.      Breath sounds: Normal breath sounds.   Abdominal:      General: Bowel sounds are normal. There is no distension.      Palpations: Abdomen is soft.      Tenderness: There is abdominal tenderness (ruq).   Musculoskeletal:         General: No swelling or tenderness.   Skin:     General: Skin is warm and dry.   Neurological:      General: No focal deficit present.      Mental Status: She is alert and oriented to person, place, and time.   Psychiatric:         Mood and Affect: Mood normal.         Fluids    Intake/Output Summary (Last 24 hours) at 2/14/2020 1106  Last data filed at 2/14/2020 0400  Gross per 24 hour   Intake 240 ml   Output --   Net 240 ml       Laboratory  Recent Labs     02/12/20  1355 02/13/20 0225 02/14/20  0324   WBC 8.8 9.4 7.6   RBC 5.20 4.89 4.58   HEMOGLOBIN 14.0 13.2 12.3   HEMATOCRIT 43.3 41.5 39.3   MCV 83.3 84.9 85.8   MCH 26.9* 27.0 26.9*   MCHC 32.3* 31.8* 31.3*   RDW 14.6* 44.5 45.2   PLATELETCT 356 317 318   MPV 9.8 10.1 9.9     Recent Labs     02/12/20  1355 02/13/20  0225 02/14/20  0324   SODIUM 142 138 141   POTASSIUM 3.5 3.9 4.2   CHLORIDE 105 104 106   CO2 22 20 22   GLUCOSE 115* 86 123*   BUN 15 11 11   CREATININE 0.8 0.49* 0.58   CALCIUM 9.6 9.2 9.2                   Imaging  OUTSIDE IMAGES-DX CHEST   Final Result      CT-FOREIGN FILM CAT SCAN    Final Result      US-FOREIGN FILM ULTRASOUND   Final Result      MR-FOREIGN FILM MRI   Final Result      DX-PORTABLE FLUOROSCOPY < 1 HOUR Is the patient pregnant? No    (Results Pending)        Assessment/Plan  * Choledocholithiasis- (present on admission)  Assessment & Plan  Admit for GI evaluation, ERCP today, Dr. Lazar consulted.  Surgery had already been consulted Dr. Mackay saw the patient in [place] but since she was transferred to [place], will need to discuss with a local surgeon, Dr Regalado will consult.  Patient is breast-feeding,  Toradol for pain unless severe.  She is written for narcotics advised her not to breast-feed after getting narcotics.  Lactated Ringer's infusion at 75 cc/h.  She does not have evidence of cholecystitis therefore I am not giving her antibiotics.    Elevated bilirubin  Assessment & Plan  Secondary to obstruction  Should trend down once removed.    Elevated transaminase level  Assessment & Plan  Secondary to obstruction  Should trend down once removed      Patient is a currently breast-feeding mother  Assessment & Plan  Breast pump being ordered for patient.       VTE prophylaxis: scds

## 2020-02-14 NOTE — ANESTHESIA PROCEDURE NOTES
Airway  Date/Time: 2/14/2020 10:57 AM  Performed by: Cayden Lock M.D.  Authorized by: Cayden Lock M.D.     Location:  OR  Urgency:  Elective  Indications for Airway Management:  Anesthesia  Spontaneous Ventilation: absent    Sedation Level:  Deep  Preoxygenated: Yes    Patient Position:  Sniffing  Mask Difficulty Assessment:  1 - vent by mask  Final Airway Type:  Endotracheal airway  Final Endotracheal Airway:  ETT  Cuffed: Yes    Technique Used for Successful ETT Placement:  Direct laryngoscopy  Insertion Site:  Oral  Blade Type:  Antoni  Laryngoscope Blade/Videolaryngoscope Blade Size:  3  ETT Size (mm):  7.0  Measured from:  Teeth  ETT to Teeth (cm):  21  Placement Verified by: auscultation and capnometry    Cormack-Lehane Classification:  Grade I - full view of glottis  Number of Attempts at Approach:  1

## 2020-02-14 NOTE — ANESTHESIA QCDR
2019 [facility] Clinical Data Registry (for Quality Improvement)     Postoperative nausea/vomiting risk protocol (Adult = 18 yrs and Pediatric 3-17 yrs)- (430 and 463)  General inhalation anesthetic (NOT TIVA) with PONV risk factors: Yes  Provision of anti-emetic therapy with at least 2 different classes of agents: Yes   Patient DID NOT receive anti-emetic therapy and reason is documented in Medical Record:  N/A    Multimodal Pain Management- (477)  Non-emergent surgery AND patient age >= 18: Yes  Use of Multimodal Pain Management, two or more drugs and/or interventions, NOT including systemic opioids: Yes  Exception: Documented allergy to multiple classes of analgesics: N/A    Smoking Abstinence (404)  Patient is current smoker (cigarette, pipe, e-cig, marijuanna): No  Elective Surgery:   Abstinence instructions provided prior to day of surgery:   Patient abstained from smoking on day of surgery:     Pre-Op Beta-Blocker in Isolated CABG (44)  Isolated CABG AND patient age >= 18:   Beta-blocker admin within 24 hours of surgical incision:   Exception:of medical reason(s) for not administering beta blocker within 24 hours prior to surgical incision (e.g., not  indicated,other medical reason):     PACU assessment of acute postoperative pain prior to Anesthesia Care End- Applies to Patients Age = 18- (ABG7)  Initial PACU pain score is which of the following: < 7/10  Patient unable to report pain score: N/A    Post-anesthetic transfer of care checklist/protocol to PACU/ICU- (426 and 427)  Upon conclusion of case, patient transferred to which of the following locations: PACU/Non-ICU  Use of transfer checklist/protocol: Yes  Exclusion: Service Performed in Patient Hospital Room (and thus did not require transfer): N/A  Unplanned admission to ICU related to anesthesia service up through end of PACU care- (MD51)  Unplanned admission to ICU (not initially anticipated at anesthesia start time): No

## 2020-02-14 NOTE — CARE PLAN
Problem: Communication  Goal: The ability to communicate needs accurately and effectively will improve  Outcome: PROGRESSING SLOWER THAN EXPECTED     Problem: Safety  Goal: Will remain free from injury  Outcome: PROGRESSING AS EXPECTED     Problem: Infection  Goal: Will remain free from infection  Outcome: PROGRESSING AS EXPECTED     Problem: Pain Management  Goal: Pain level will decrease to patient's comfort goal  Outcome: PROGRESSING SLOWER THAN EXPECTED

## 2020-02-14 NOTE — OP REPORT
DATE OF SERVICE:  02/14/2020    GASTROENTEROLOGIST:  Nirav Rodriguez MD    ANESTHESIOLOGIST:  Cayden Lock MD    MEDICATIONS:  General anesthesia.    PROCEDURE PERFORMED:  Endoscopic retrograde cholangiography with   sphincterotomy and stone removal.    PREPROCEDURE DIAGNOSIS:  Choledocholithiasis.    POSTPROCEDURE DIAGNOSIS:  Choledocholithiasis, resolved by therapeutic   intervention by endoscopic retrograde cholangiopancreatography.    CONSENT:  Procedure risks and benefits were reviewed thoroughly with the   patient, risks including but not limited to bleeding, perforation, side   effects of medication were all informed.  The patient voiced understanding and   agreed to proceed.  Additional risks inherent to ERCP that being mild,   moderate, severe pancreatitis that could lead to postprocedural pain,   prolonged hospitalization, intensive care unit stay and/or death reviewed with   the patient who voiced understanding and agreed to proceed.    DESCRIPTION OF PROCEDURE:  The patient was placed in a left lateral decubitus   position after intubation and sedation, a side-viewing duodenoscope was passed   carefully and easily under indirect visualization into the esophagus, through   to the stomach, which revealed a large amount of undigested food, through the   duodenal bulb along duodenal sweep and the second portion of duodenum,   brought in a shortened position.  The ampulla was visualized.  It was cleared   of all food stuffs.  No bile was exiting from it.  A CleverCut sphincterotome   with 0.035 wire was utilized to cannulate the ampulla.  Cannulation was   achieved within seconds.  The wire was in the trajectory of the biliary system   and confirmed by aspiration of bile and then subsequently confirmed by   cholangiogram, which revealed filling defect in the distal bile duct.  After   which the tome was retracted, a sphincterotomy was performed, the wire was   maintained, the tome was exchanged for a  biliary balloon.  A balloon was   advanced into the proximal common bile duct and dredging was performed   revealing small stones.  An occlusion balloon cholangiogram was then performed   revealing no further evidence for filling defects.  A 9 mm balloon was   removed through the cut ampulla without difficulty.  All the contrast that was   injected into the biliary system spontaneously exited the biliary system   within less than 1 minute.  The stomach was suctioned of all air contents and   the scope was removed.    COMPLICATIONS:  None.    BLOOD LOSS:  None.    SPECIMENS:  None.    RECOMMENDATIONS:  Successful therapeutic intervention with resolution of   choledocholithiasis.  The patient may be started on a clear liquid diet,   advance as tolerated.  The above will be reviewed with the patient.  All   questions will be answered to her satisfaction.  Orders were present in the   EMR.       ____________________________________     Nirav MD KIERSTEN Harris / CAROLINE    DD:  02/14/2020 11:36:38  DT:  02/14/2020 11:46:01    D#:  [id number]  Job#:  [id number]

## 2020-02-15 ENCOUNTER — ANESTHESIA EVENT (OUTPATIENT)
Dept: SURGERY | Facility: MEDICAL CENTER | Age: 25
DRG: 419 | End: 2020-02-15

## 2020-02-15 ENCOUNTER — ANESTHESIA (OUTPATIENT)
Dept: SURGERY | Facility: MEDICAL CENTER | Age: 25
DRG: 419 | End: 2020-02-15

## 2020-02-15 VITALS
WEIGHT: 163.36 LBS | HEIGHT: 67 IN | BODY MASS INDEX: 25.64 KG/M2 | SYSTOLIC BLOOD PRESSURE: 124 MMHG | HEART RATE: 72 BPM | RESPIRATION RATE: 20 BRPM | DIASTOLIC BLOOD PRESSURE: 84 MMHG | TEMPERATURE: 98 F | OXYGEN SATURATION: 95 %

## 2020-02-15 LAB
ALBUMIN SERPL BCP-MCNC: 4.2 G/DL (ref 3.2–4.9)
ALBUMIN/GLOB SERPL: 1.4 G/DL
ALP SERPL-CCNC: 280 U/L (ref 30–99)
ALT SERPL-CCNC: 540 U/L (ref 2–50)
ANION GAP SERPL CALC-SCNC: 15 MMOL/L (ref 7–16)
AST SERPL-CCNC: 462 U/L (ref 12–45)
BASOPHILS # BLD AUTO: 0.1 % (ref 0–1.8)
BASOPHILS # BLD: 0.01 K/UL (ref 0–0.12)
BILIRUB SERPL-MCNC: 1 MG/DL (ref 0.1–1.5)
BUN SERPL-MCNC: 7 MG/DL (ref 8–22)
CALCIUM SERPL-MCNC: 9.2 MG/DL (ref 8.4–10.2)
CHLORIDE SERPL-SCNC: 104 MMOL/L (ref 96–112)
CO2 SERPL-SCNC: 22 MMOL/L (ref 20–33)
CREAT SERPL-MCNC: 0.54 MG/DL (ref 0.5–1.4)
EOSINOPHIL # BLD AUTO: 0.09 K/UL (ref 0–0.51)
EOSINOPHIL NFR BLD: 1.3 % (ref 0–6.9)
ERYTHROCYTE [DISTWIDTH] IN BLOOD BY AUTOMATED COUNT: 47.1 FL (ref 35.9–50)
GLOBULIN SER CALC-MCNC: 3.1 G/DL (ref 1.9–3.5)
GLUCOSE SERPL-MCNC: 94 MG/DL (ref 65–99)
HCT VFR BLD AUTO: 39 % (ref 37–47)
HGB BLD-MCNC: 12.2 G/DL (ref 12–16)
IMM GRANULOCYTES # BLD AUTO: 0.02 K/UL (ref 0–0.11)
IMM GRANULOCYTES NFR BLD AUTO: 0.3 % (ref 0–0.9)
INR PPP: 0.92 (ref 0.87–1.13)
LYMPHOCYTES # BLD AUTO: 2.49 K/UL (ref 1–4.8)
LYMPHOCYTES NFR BLD: 35.2 % (ref 22–41)
MCH RBC QN AUTO: 27 PG (ref 27–33)
MCHC RBC AUTO-ENTMCNC: 31.3 G/DL (ref 33.6–35)
MCV RBC AUTO: 86.3 FL (ref 81.4–97.8)
MONOCYTES # BLD AUTO: 0.73 K/UL (ref 0–0.85)
MONOCYTES NFR BLD AUTO: 10.3 % (ref 0–13.4)
NEUTROPHILS # BLD AUTO: 3.74 K/UL (ref 2–7.15)
NEUTROPHILS NFR BLD: 52.8 % (ref 44–72)
NRBC # BLD AUTO: 0 K/UL
NRBC BLD-RTO: 0 /100 WBC
PATHOLOGY CONSULT NOTE: NORMAL
PLATELET # BLD AUTO: 288 K/UL (ref 164–446)
PMV BLD AUTO: 9.9 FL (ref 9–12.9)
POTASSIUM SERPL-SCNC: 3.9 MMOL/L (ref 3.6–5.5)
PROT SERPL-MCNC: 7.3 G/DL (ref 6–8.2)
PROTHROMBIN TIME: 12.4 SEC (ref 12–14.6)
RBC # BLD AUTO: 4.52 M/UL (ref 4.2–5.4)
SODIUM SERPL-SCNC: 141 MMOL/L (ref 135–145)
WBC # BLD AUTO: 7.1 K/UL (ref 4.8–10.8)

## 2020-02-15 PROCEDURE — 85025 COMPLETE CBC W/AUTO DIFF WBC: CPT

## 2020-02-15 PROCEDURE — 700105 HCHG RX REV CODE 258: Performed by: HOSPITALIST

## 2020-02-15 PROCEDURE — 160039 HCHG SURGERY MINUTES - EA ADDL 1 MIN LEVEL 3: Performed by: SURGERY

## 2020-02-15 PROCEDURE — 85610 PROTHROMBIN TIME: CPT

## 2020-02-15 PROCEDURE — 160002 HCHG RECOVERY MINUTES (STAT): Performed by: SURGERY

## 2020-02-15 PROCEDURE — A6402 STERILE GAUZE <= 16 SQ IN: HCPCS | Performed by: SURGERY

## 2020-02-15 PROCEDURE — 700101 HCHG RX REV CODE 250: Performed by: ANESTHESIOLOGY

## 2020-02-15 PROCEDURE — 88304 TISSUE EXAM BY PATHOLOGIST: CPT

## 2020-02-15 PROCEDURE — 501583 HCHG TROCAR, THRD CAN&SEAL 5X100: Performed by: SURGERY

## 2020-02-15 PROCEDURE — 99239 HOSP IP/OBS DSCHRG MGMT >30: CPT | Performed by: INTERNAL MEDICINE

## 2020-02-15 PROCEDURE — 501572 HCHG TROCAR, SHIELD OBTU 5X100: Performed by: SURGERY

## 2020-02-15 PROCEDURE — 700102 HCHG RX REV CODE 250 W/ 637 OVERRIDE(OP): Performed by: HOSPITALIST

## 2020-02-15 PROCEDURE — 700102 HCHG RX REV CODE 250 W/ 637 OVERRIDE(OP): Performed by: ANESTHESIOLOGY

## 2020-02-15 PROCEDURE — 501582 HCHG TROCAR, THRD BLADED: Performed by: SURGERY

## 2020-02-15 PROCEDURE — 502571 HCHG PACK, LAP CHOLE: Performed by: SURGERY

## 2020-02-15 PROCEDURE — 160009 HCHG ANES TIME/MIN: Performed by: SURGERY

## 2020-02-15 PROCEDURE — 160035 HCHG PACU - 1ST 60 MINS PHASE I: Performed by: SURGERY

## 2020-02-15 PROCEDURE — 500521 HCHG ENDOSTITCH LOAD UNIT: Performed by: SURGERY

## 2020-02-15 PROCEDURE — 160028 HCHG SURGERY MINUTES - 1ST 30 MINS LEVEL 3: Performed by: SURGERY

## 2020-02-15 PROCEDURE — 501584 HCHG TROCAR, THRD CAN&SEAL11X100: Performed by: SURGERY

## 2020-02-15 PROCEDURE — 700111 HCHG RX REV CODE 636 W/ 250 OVERRIDE (IP): Performed by: ANESTHESIOLOGY

## 2020-02-15 PROCEDURE — 700101 HCHG RX REV CODE 250: Performed by: SURGERY

## 2020-02-15 PROCEDURE — 36415 COLL VENOUS BLD VENIPUNCTURE: CPT

## 2020-02-15 PROCEDURE — 160048 HCHG OR STATISTICAL LEVEL 1-5: Performed by: SURGERY

## 2020-02-15 PROCEDURE — 501399 HCHG SPECIMAN BAG, ENDO CATC: Performed by: SURGERY

## 2020-02-15 PROCEDURE — 501838 HCHG SUTURE GENERAL: Performed by: SURGERY

## 2020-02-15 PROCEDURE — A9270 NON-COVERED ITEM OR SERVICE: HCPCS | Performed by: HOSPITALIST

## 2020-02-15 PROCEDURE — 0FT44ZZ RESECTION OF GALLBLADDER, PERCUTANEOUS ENDOSCOPIC APPROACH: ICD-10-PCS | Performed by: SURGERY

## 2020-02-15 PROCEDURE — 80053 COMPREHEN METABOLIC PANEL: CPT

## 2020-02-15 PROCEDURE — A9270 NON-COVERED ITEM OR SERVICE: HCPCS | Performed by: ANESTHESIOLOGY

## 2020-02-15 RX ORDER — CEFOTETAN DISODIUM 2 G/20ML
INJECTION, POWDER, FOR SOLUTION INTRAMUSCULAR; INTRAVENOUS PRN
Status: DISCONTINUED | OUTPATIENT
Start: 2020-02-15 | End: 2020-02-15 | Stop reason: HOSPADM

## 2020-02-15 RX ORDER — HYDROMORPHONE HYDROCHLORIDE 1 MG/ML
0.4 INJECTION, SOLUTION INTRAMUSCULAR; INTRAVENOUS; SUBCUTANEOUS
Status: DISCONTINUED | OUTPATIENT
Start: 2020-02-15 | End: 2020-02-15 | Stop reason: HOSPADM

## 2020-02-15 RX ORDER — HYDRALAZINE HYDROCHLORIDE 20 MG/ML
5 INJECTION INTRAMUSCULAR; INTRAVENOUS
Status: DISCONTINUED | OUTPATIENT
Start: 2020-02-15 | End: 2020-02-15 | Stop reason: HOSPADM

## 2020-02-15 RX ORDER — DEXAMETHASONE SODIUM PHOSPHATE 4 MG/ML
INJECTION, SOLUTION INTRA-ARTICULAR; INTRALESIONAL; INTRAMUSCULAR; INTRAVENOUS; SOFT TISSUE PRN
Status: DISCONTINUED | OUTPATIENT
Start: 2020-02-15 | End: 2020-02-15 | Stop reason: SURG

## 2020-02-15 RX ORDER — METOPROLOL TARTRATE 1 MG/ML
1 INJECTION, SOLUTION INTRAVENOUS
Status: DISCONTINUED | OUTPATIENT
Start: 2020-02-15 | End: 2020-02-15 | Stop reason: HOSPADM

## 2020-02-15 RX ORDER — OXYCODONE HCL 5 MG/5 ML
10 SOLUTION, ORAL ORAL
Status: COMPLETED | OUTPATIENT
Start: 2020-02-15 | End: 2020-02-15

## 2020-02-15 RX ORDER — HYDROCODONE BITARTRATE AND ACETAMINOPHEN 5; 325 MG/1; MG/1
1-2 TABLET ORAL EVERY 4 HOURS PRN
Qty: 20 TAB | Refills: 0 | Status: SHIPPED | OUTPATIENT
Start: 2020-02-15 | End: 2020-02-20

## 2020-02-15 RX ORDER — OXYCODONE HCL 5 MG/5 ML
5 SOLUTION, ORAL ORAL
Status: COMPLETED | OUTPATIENT
Start: 2020-02-15 | End: 2020-02-15

## 2020-02-15 RX ORDER — ONDANSETRON 2 MG/ML
4 INJECTION INTRAMUSCULAR; INTRAVENOUS
Status: DISCONTINUED | OUTPATIENT
Start: 2020-02-15 | End: 2020-02-15 | Stop reason: HOSPADM

## 2020-02-15 RX ORDER — MORPHINE SULFATE 10 MG/ML
1-5 INJECTION, SOLUTION INTRAMUSCULAR; INTRAVENOUS
Status: DISCONTINUED | OUTPATIENT
Start: 2020-02-15 | End: 2020-02-15 | Stop reason: HOSPADM

## 2020-02-15 RX ORDER — HALOPERIDOL 5 MG/ML
1 INJECTION INTRAMUSCULAR
Status: DISCONTINUED | OUTPATIENT
Start: 2020-02-15 | End: 2020-02-15 | Stop reason: HOSPADM

## 2020-02-15 RX ORDER — DIPHENHYDRAMINE HYDROCHLORIDE 50 MG/ML
12.5 INJECTION INTRAMUSCULAR; INTRAVENOUS
Status: DISCONTINUED | OUTPATIENT
Start: 2020-02-15 | End: 2020-02-15 | Stop reason: HOSPADM

## 2020-02-15 RX ORDER — ONDANSETRON 2 MG/ML
INJECTION INTRAMUSCULAR; INTRAVENOUS PRN
Status: DISCONTINUED | OUTPATIENT
Start: 2020-02-15 | End: 2020-02-15 | Stop reason: SURG

## 2020-02-15 RX ORDER — SODIUM CHLORIDE, SODIUM LACTATE, POTASSIUM CHLORIDE, CALCIUM CHLORIDE 600; 310; 30; 20 MG/100ML; MG/100ML; MG/100ML; MG/100ML
INJECTION, SOLUTION INTRAVENOUS CONTINUOUS
Status: DISCONTINUED | OUTPATIENT
Start: 2020-02-15 | End: 2020-02-15 | Stop reason: HOSPADM

## 2020-02-15 RX ORDER — HYDROMORPHONE HYDROCHLORIDE 1 MG/ML
0.6 INJECTION, SOLUTION INTRAMUSCULAR; INTRAVENOUS; SUBCUTANEOUS
Status: DISCONTINUED | OUTPATIENT
Start: 2020-02-15 | End: 2020-02-15 | Stop reason: HOSPADM

## 2020-02-15 RX ORDER — MEPERIDINE HYDROCHLORIDE 25 MG/ML
6.25 INJECTION INTRAMUSCULAR; INTRAVENOUS; SUBCUTANEOUS
Status: DISCONTINUED | OUTPATIENT
Start: 2020-02-15 | End: 2020-02-15 | Stop reason: HOSPADM

## 2020-02-15 RX ORDER — HYDROMORPHONE HYDROCHLORIDE 1 MG/ML
0.2 INJECTION, SOLUTION INTRAMUSCULAR; INTRAVENOUS; SUBCUTANEOUS
Status: DISCONTINUED | OUTPATIENT
Start: 2020-02-15 | End: 2020-02-15 | Stop reason: HOSPADM

## 2020-02-15 RX ORDER — BUPIVACAINE HYDROCHLORIDE AND EPINEPHRINE 5; 5 MG/ML; UG/ML
INJECTION, SOLUTION PERINEURAL
Status: DISCONTINUED | OUTPATIENT
Start: 2020-02-15 | End: 2020-02-15 | Stop reason: HOSPADM

## 2020-02-15 RX ADMIN — DEXAMETHASONE SODIUM PHOSPHATE 4 MG: 4 INJECTION, SOLUTION INTRAMUSCULAR; INTRAVENOUS at 10:53

## 2020-02-15 RX ADMIN — CEFOTETAN DISODIUM 2 G: 2 INJECTION, POWDER, FOR SOLUTION INTRAMUSCULAR; INTRAVENOUS at 09:55

## 2020-02-15 RX ADMIN — FENTANYL CITRATE 25 MCG: 50 INJECTION INTRAMUSCULAR; INTRAVENOUS at 11:18

## 2020-02-15 RX ADMIN — OXYCODONE HYDROCHLORIDE 5 MG: 5 TABLET ORAL at 16:08

## 2020-02-15 RX ADMIN — FENTANYL CITRATE 50 MCG: 50 INJECTION, SOLUTION INTRAMUSCULAR; INTRAVENOUS at 10:15

## 2020-02-15 RX ADMIN — SODIUM CHLORIDE, POTASSIUM CHLORIDE, SODIUM LACTATE AND CALCIUM CHLORIDE: 600; 310; 30; 20 INJECTION, SOLUTION INTRAVENOUS at 10:04

## 2020-02-15 RX ADMIN — MIDAZOLAM HYDROCHLORIDE 2 MG: 1 INJECTION, SOLUTION INTRAMUSCULAR; INTRAVENOUS at 09:55

## 2020-02-15 RX ADMIN — OXYCODONE HYDROCHLORIDE 10 MG: 5 SOLUTION ORAL at 11:13

## 2020-02-15 RX ADMIN — PROPOFOL 200 MG: 10 INJECTION, EMULSION INTRAVENOUS at 09:56

## 2020-02-15 RX ADMIN — FENTANYL CITRATE 100 MCG: 50 INJECTION, SOLUTION INTRAMUSCULAR; INTRAVENOUS at 09:56

## 2020-02-15 RX ADMIN — ONDANSETRON 4 MG: 2 INJECTION INTRAMUSCULAR; INTRAVENOUS at 10:53

## 2020-02-15 RX ADMIN — ROCURONIUM BROMIDE 50 MG: 10 INJECTION INTRAVENOUS at 09:57

## 2020-02-15 ASSESSMENT — PAIN SCALES - GENERAL: PAIN_LEVEL: 2

## 2020-02-15 NOTE — DISCHARGE SUMMARY
Discharge Summary    CHIEF COMPLAINT ON ADMISSION  No chief complaint on file.      Reason for Admission  Common Bile Duct Stone     Admission Date  2/12/2020    CODE STATUS  Full Code    HPI & HOSPITAL COURSE  This is a 24 y.o. female here with right upper quadrant pain.  She was    transferred from [facility] after MRCP showed choledocholithiasis.  She is still breast feeding her son who was born in May 2019.  She was sent to [facility] as [facility] doesn't have ERCP available at their facility.  Patient seen by GI - Dr Lazar and Dr Mackay saw patient when in [place] but she needs surgeon here in [place], I consulted Dr Regalado.  She underwent ERCP with Dr Rodriguez on 2/14 and stone removed.  She had laparoscopic cholecystectomy with Dr Regalado on 2/15 and tolerated it well.  She should be on a liquid diet for next 48 hours and then okay to advance to regular.  Pain medication prescribed on discharge but patient instructed that if she takes this she cannot breastfeed her child and should pump and dump the breast milk.  For mild pain, she should take over the counter tylenol.     Therefore, she is discharged in good and stable condition to home with close outpatient follow-up.    The patient met 2-midnight criteria for an inpatient stay at the time of discharge.    Discharge Date  2/15/2020    FOLLOW UP ITEMS POST DISCHARGE  Dr Regalado - 1-10 days  PCP - [facility] [place], [place] 1-2 weeks.    DISCHARGE DIAGNOSES  Principal Problem:    Choledocholithiasis POA: Yes  Active Problems:    Patient is a currently breast-feeding mother POA: Unknown    Elevated transaminase level POA: Unknown    Elevated bilirubin POA: Unknown  Resolved Problems:    * No resolved hospital problems. *      FOLLOW UP  No future appointments.  Diego Regalado M.D.  [address] [address]  [phone]    In 1 week      your primary care physician.    In 2 weeks        MEDICATIONS ON DISCHARGE     Medication List      START taking these  medications      Instructions   HYDROcodone-acetaminophen 5-325 MG Tabs per tablet  Commonly known as:  Norco   Take 1-2 Tabs by mouth every four hours as needed for up to 5 days.  Dose:  1-2 Tab        CONTINUE taking these medications      Instructions   famotidine 20 MG Tabs  Commonly known as:  PEPCID   Take 20 mg by mouth 1 time daily as needed. Indications: Heartburn  Dose:  20 mg            Allergies  No Known Allergies    DIET  Orders Placed This Encounter   Procedures   • Diet Order Clear Liquid (advance as tolerated)     Standing Status:   Standing     Number of Occurrences:   1     Order Specific Question:   Diet:     Answer:   Clear Liquid [10]     Comments:   advance as tolerated       ACTIVITY  As tolerated.  20-lb lifting restriction for 4 weeks, showers only x 2 weeks and remove tegaderms in 4 days    CONSULTATIONS  Luke Vicente - [facility]   Diego [facility] - Surgery    PROCEDURES  2/14/2020 - [facility] - ERCP with sphincterotomy and stone removal  2/15/2020 - [facility] - Laparoscopic cholecystectomy    LABORATORY  Lab Results   Component Value Date    SODIUM 141 02/15/2020    POTASSIUM 3.9 02/15/2020    CHLORIDE 104 02/15/2020    CO2 22 02/15/2020    GLUCOSE 94 02/15/2020    BUN 7 (L) 02/15/2020    CREATININE 0.54 02/15/2020        Lab Results   Component Value Date    WBC 7.1 02/15/2020    HEMOGLOBIN 12.2 02/15/2020    HEMATOCRIT 39.0 02/15/2020    PLATELETCT 288 02/15/2020        Total time of the discharge process exceeds 35 minutes.

## 2020-02-15 NOTE — ANESTHESIA PROCEDURE NOTES
Airway  Date/Time: 2/15/2020 9:58 AM  Performed by: Jassi Adame M.D.  Authorized by: Jassi Adame M.D.     Location:  OR  Urgency:  Elective  Indications for Airway Management:  Anesthesia  Spontaneous Ventilation: absent    Sedation Level:  Deep  Preoxygenated: Yes    Patient Position:  Sniffing  Final Airway Type:  Endotracheal airway  Final Endotracheal Airway:  ETT  Cuffed: Yes    Technique Used for Successful ETT Placement:  Direct laryngoscopy  Insertion Site:  Oral  Blade Type:  Antoni  Laryngoscope Blade/Videolaryngoscope Blade Size:  3  ETT Size (mm):  7.0  Measured from:  Teeth  ETT to Teeth (cm):  22  Placement Verified by: auscultation and capnometry    Cormack-Lehane Classification:  Grade I - full view of glottis  Number of Attempts at Approach:  1

## 2020-02-15 NOTE — ANESTHESIA QCDR
2019 [facility] Clinical Data Registry (for Quality Improvement)     Postoperative nausea/vomiting risk protocol (Adult = 18 yrs and Pediatric 3-17 yrs)- (430 and 463)  General inhalation anesthetic (NOT TIVA) with PONV risk factors: Yes  Provision of anti-emetic therapy with at least 2 different classes of agents: Yes   Patient DID NOT receive anti-emetic therapy and reason is documented in Medical Record:  N/A    Multimodal Pain Management- (477)  Non-emergent surgery AND patient age >= 18:   Use of Multimodal Pain Management, two or more drugs and/or interventions, NOT including systemic opioids:   Exception: Documented allergy to multiple classes of analgesics:     Smoking Abstinence (404)  Patient is current smoker (cigarette, pipe, e-cig, marijuanna):   Elective Surgery:   Abstinence instructions provided prior to day of surgery:   Patient abstained from smoking on day of surgery:     Pre-Op Beta-Blocker in Isolated CABG (44)  Isolated CABG AND patient age >= 18:   Beta-blocker admin within 24 hours of surgical incision:   Exception:of medical reason(s) for not administering beta blocker within 24 hours prior to surgical incision (e.g., not  indicated,other medical reason):     PACU assessment of acute postoperative pain prior to Anesthesia Care End- Applies to Patients Age = 18- (ABG7)  Initial PACU pain score is which of the following: < 7/10  Patient unable to report pain score: N/A    Post-anesthetic transfer of care checklist/protocol to PACU/ICU- (426 and 427)  Upon conclusion of case, patient transferred to which of the following locations: PACU/Non-ICU  Use of transfer checklist/protocol:   Exclusion: Service Performed in Patient Hospital Room (and thus did not require transfer):   Unplanned admission to ICU related to anesthesia service up through end of PACU care- (MD51)  Unplanned admission to ICU (not initially anticipated at anesthesia start time): No

## 2020-02-15 NOTE — CARE PLAN
Problem: Communication  Goal: The ability to communicate needs accurately and effectively will improve  Outcome: PROGRESSING AS EXPECTED     Problem: Safety  Goal: Will remain free from injury  Outcome: PROGRESSING AS EXPECTED     Problem: Infection  Goal: Will remain free from infection  Outcome: PROGRESSING AS EXPECTED     Problem: Knowledge Deficit  Goal: Knowledge of disease process/condition, treatment plan, diagnostic tests, and medications will improve  Outcome: PROGRESSING AS EXPECTED     Problem: Pain Management  Goal: Pain level will decrease to patient's comfort goal  Outcome: PROGRESSING AS EXPECTED

## 2020-02-15 NOTE — OR NURSING
1051: To PACU post lap ricky. Pt is extubated, breathing is spontaneous and unlabored. 3 lap stab incisions w/ scant drainage.  1109: Pain is 5/10. Pt agrees to oxycodone.  1115: Pain increasing, see MAR.  1140: Pain is tolerable at 3-4/10, tolerable w/o nausea. Meets criteria for transfer back to room.

## 2020-02-15 NOTE — OR SURGEON
Immediate Post OP Note    PreOp Diagnosis: Cholelithiasis     PostOp Diagnosis: Same    Procedure(s):  CHOLECYSTECTOMY, LAPAROSCOPIC - Wound Class: Clean Contaminated    Surgeon(s):  Diego Regalado M.D.    Anesthesiologist/Type of Anesthesia:GET  Anesthesiologist: Jassi Adame M.D./General    Surgical Staff:  Circulator: Espinoza Aguilar R.N.  Scrub Person: Reyes Shannon; Tulio Aparicio    Specimens removed if any:  ID Type Source Tests Collected by Time Destination   A :  Tissue Gallbladder PATHOLOGY SPECIMEN Diego Regalado M.D. 2/15/2020  9:47 AM        Estimated Blood Loss: None    Findings: dilated cystic duct, but no acute inflammation    Complications: none        2/15/2020 10:56 AM Diego Regalado M.D.

## 2020-02-15 NOTE — CONSULTS
DATE OF SERVICE:  2020    REFERRING PHYSICIAN:  Khalida Segura DO    CONSULTING PHYSICIAN:  Diego Regalado MD    REASON FOR CONSULTATION:  Need for cholecystectomy.    HISTORY OF PRESENT ILLNESS:  The patient is a 24-year-old female who, a week   ago, presented to the emergency room in [place] with abdominal pain and   was told that she had gallstones.  She was sent home without any referral to a   surgeon, then presented on the  with increasing pain.  She was   re-evaluated and felt to have choledocholithiasis.  Because of their inability   to have that treated at their facility, she was transferred to [facility]   where she was evaluated by GI, underwent an ERCP today with stone extraction   and now have been contacted to have the patient be seen for cholecystectomy.    Patient states that she was just having pain, no nausea or vomiting, no   bloating.  She did notice her urine was turning dark orange over the last day   or so.    ALLERGIES:  No known drug allergies.    MEDICATIONS:  Only on Pepcid p.o. previously for medication.    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    FAMILY MEDICAL HISTORY:  Arthritis.    SOCIAL HISTORY:  Does not smoke, drink, or take drugs.    REVIEW OF SYSTEMS:  Patient denies any nausea or vomiting.  No neurologic or   cardiopulmonary problems, abdominal pain only.  History of recent tightly   diagnosed gallstones.  All other systems are negative and noncontributory.    Recently gave birth to a healthy infant in 2019, still breastfeeding.    PHYSICAL EXAMINATION:  GENERAL:  Patient is a well-nourished adult female resting comfortably in the   bed, nursing her child.  VITAL SIGNS:  Temperature of 37, heart rate of 70, respiration rate 17, blood   pressure 117/84, BMI of 25.58.  HEAD AND NECK:  Pupils equal, round, and react to light.  Extraocular   movements are intact.  No scleral icterus.  No cervical adenopathy.  LUNGS:  Clear bilaterally  without wheezes, rales, or rhonchi.  Normal chest   wall expansion.  HEART:  Regular rate and rhythm without murmurs, rubs, or gallops.  No carotid   bruits.  No jugular venous distention.  No peripheral edema.  ABDOMEN:  Soft with only mild tenderness to deep palpation in the right upper   quadrant, but no rebound, guarding, or peritoneal signs.  No palpable masses.  MUSCULOSKELETAL:  Moves all 4 extremities in normal range of motion.  Strength   grossly normal.  NEUROLOGICAL:  Cranial nerves II-XII are grossly intact.  Sensation grossly   normal.    LABORATORY DATA:  Show sodium of 141, potassium of 4.2, chloride of 102,   bicarb 22, glucose of 123, AST of 322, ALT of 212, alkaline phosphatase of   220, total bilirubin 1.8.  White count of 7.6, hemoglobin and hematocrit of   12.3 and 39.3 with 318,000 platelets.  ERCP shows removal of common duct   stones.    IMPRESSION:  Recent choledocholithiasis, now relieved by endoscopic retrograde   cholangiopancreatography.  The patient needs laparoscopic cholecystectomy.  I   have explained the risks and benefits of the surgery to her, which will be   performed tomorrow.  The risks include bleeding, infection, injury to the   bowel, common bile duct, postoperative bile leak, and postoperative diarrhea.    She understands these risks and agrees to proceed.       ____________________________________     Diego Regalado MD    PMS / NTS    DD:  02/14/2020 14:50:28  DT:  02/14/2020 18:44:50    D#:  [id number]  Job#:  [id number]

## 2020-02-15 NOTE — ANESTHESIA TIME REPORT
Anesthesia Start and Stop Event Times     Date Time Event    2/15/2020 0954 Ready for Procedure     0955 Anesthesia Start     1056 Anesthesia Stop        Responsible Staff  02/15/20    Name Role Begin End    Jassi Adame M.D. Anesth 0955 1056        Preop Diagnosis (Free Text):  Pre-op Diagnosis     choledocholithiasis        Preop Diagnosis (Codes):    Post op Diagnosis  S/P laparoscopic cholecystectomy      Premium Reason  E. Weekend    Comments: Premium Deep

## 2020-02-15 NOTE — PROGRESS NOTES
Surgical Progress Note    Author: Diego Regalado M.D. Date & Time created: 2/15/2020   11:01 AM     Interval Events:    ROS  Hemodynamics:  Temp (24hrs), Av.7 °C (98 °F), Min:36.2 °C (97.2 °F), Max:37 °C (98.6 °F)  Temperature: 36.6 °C (97.9 °F)  Pulse  Av.5  Min: 47  Max: 102   Blood Pressure: 119/61     Respiratory:    Respiration: (!) 10, Pulse Oximetry: 99 %           Neuro:  GCS       Fluids:    Intake/Output Summary (Last 24 hours) at 2/15/2020 1101  Last data filed at 2/15/2020 1053  Gross per 24 hour   Intake 1400 ml   Output 25 ml   Net 1375 ml        Current Diet Order   Procedures   • Diet Order Clear Liquid (advance as tolerated)     Physical Exam  Labs:  Recent Results (from the past 24 hour(s))   CBC WITH DIFFERENTIAL    Collection Time: 02/15/20  7:07 AM   Result Value Ref Range    WBC 7.1 4.8 - 10.8 K/uL    RBC 4.52 4.20 - 5.40 M/uL    Hemoglobin 12.2 12.0 - 16.0 g/dL    Hematocrit 39.0 37.0 - 47.0 %    MCV 86.3 81.4 - 97.8 fL    MCH 27.0 27.0 - 33.0 pg    MCHC 31.3 (L) 33.6 - 35.0 g/dL    RDW 47.1 35.9 - 50.0 fL    Platelet Count 288 164 - 446 K/uL    MPV 9.9 9.0 - 12.9 fL    Neutrophils-Polys 52.80 44.00 - 72.00 %    Lymphocytes 35.20 22.00 - 41.00 %    Monocytes 10.30 0.00 - 13.40 %    Eosinophils 1.30 0.00 - 6.90 %    Basophils 0.10 0.00 - 1.80 %    Immature Granulocytes 0.30 0.00 - 0.90 %    Nucleated RBC 0.00 /100 WBC    Neutrophils (Absolute) 3.74 2.00 - 7.15 K/uL    Lymphs (Absolute) 2.49 1.00 - 4.80 K/uL    Monos (Absolute) 0.73 0.00 - 0.85 K/uL    Eos (Absolute) 0.09 0.00 - 0.51 K/uL    Baso (Absolute) 0.01 0.00 - 0.12 K/uL    Immature Granulocytes (abs) 0.02 0.00 - 0.11 K/uL    NRBC (Absolute) 0.00 K/uL   Comp Metabolic Panel    Collection Time: 02/15/20  7:07 AM   Result Value Ref Range    Sodium 141 135 - 145 mmol/L    Potassium 3.9 3.6 - 5.5 mmol/L    Chloride 104 96 - 112 mmol/L    Co2 22 20 - 33 mmol/L    Anion Gap 15.0 7.0 - 16.0    Glucose 94 65 - 99 mg/dL    Bun 7 (L) 8 -  22 mg/dL    Creatinine 0.54 0.50 - 1.40 mg/dL    Calcium 9.2 8.4 - 10.2 mg/dL    AST(SGOT) 462 (H) 12 - 45 U/L    ALT(SGPT) 540 (H) 2 - 50 U/L    Alkaline Phosphatase 280 (H) 30 - 99 U/L    Total Bilirubin 1.0 0.1 - 1.5 mg/dL    Albumin 4.2 3.2 - 4.9 g/dL    Total Protein 7.3 6.0 - 8.2 g/dL    Globulin 3.1 1.9 - 3.5 g/dL    A-G Ratio 1.4 g/dL   ESTIMATED GFR    Collection Time: 02/15/20  7:07 AM   Result Value Ref Range    GFR If African American >60 >60 mL/min/1.73 m 2    GFR If Non African American >60 >60 mL/min/1.73 m 2   Prothrombin Time    Collection Time: 02/15/20  7:41 AM   Result Value Ref Range    PT 12.4 12.0 - 14.6 sec    INR 0.92 0.87 - 1.13   Histology Request    Collection Time: 02/15/20 10:57 AM   Result Value Ref Range    Pathology Request Sent to Histo      Medical Decision Making, by Problem:  Active Hospital Problems    Diagnosis   • Elevated transaminase level [R74.0]   • Elevated bilirubin [R17]   • Choledocholithiasis [K80.50]   • Patient is a currently breast-feeding mother [Z39.1]     Plan:  If patient goes home today: have her follow up with me in 7-10 days, no lifting weight greater than 20 lbs x 4 weeks, showers only x 2 weeks and remove tegaderms in 4 days    Quality Measures:  Quality-Core Measures    Discussed patient condition with Patient

## 2020-02-15 NOTE — PROGRESS NOTES
0700: Bedside report from Ruben MOHR RN. Pt wakes to voice. No needs at this time. Pending surgery at 1000. Pt has been NPO since midnight. SO and son at bedside.

## 2020-02-15 NOTE — CARE PLAN
Problem: Communication  Goal: The ability to communicate needs accurately and effectively will improve  Outcome: PROGRESSING AS EXPECTED     Problem: Venous Thromboembolism (VTW)/Deep Vein Thrombosis (DVT) Prevention:  Goal: Patient will participate in Venous Thrombosis (VTE)/Deep Vein Thrombosis (DVT)Prevention Measures  Outcome: PROGRESSING AS EXPECTED     Problem: Pain Management  Goal: Pain level will decrease to patient's comfort goal  Outcome: PROGRESSING AS EXPECTED

## 2020-02-15 NOTE — DISCHARGE INSTRUCTIONS
Follow up with Dr Regalado in 7-10 days, no lifting weight greater than 20 lbs x 4 weeks, showers only x 2 weeks and remove tegaderms in 4 days.    Pain medication with Norco for severe pain only, when taking this medication, do not breast feed (pump and dump).  If you have mild pain, okay to take Tylenol and then breast feeding is okay.      Laparoscopic Cholecystectomy, Care After  These instructions give you information on caring for yourself after your procedure. Your doctor may also give you more specific instructions. Call your doctor if you have any problems or questions after your procedure.   HOME CARE  · Change your bandages (dressings) as told by your doctor.  · Keep the wound dry and clean. Wash the wound gently with soap and water. Pat the wound dry with a clean towel.  · Do not take baths, swim, or use hot tubs for 2 weeks, or as told by your doctor.  · Only take medicine as told by your doctor.  · Eat a normal diet as told by your doctor.  · Do not lift anything heavier than 10 pounds (4.5 kg) until your doctor says it is okay.  · Do not play contact sports for 1 week, or as told by your doctor.  GET HELP IF:  · Your wound is red, puffy (swollen), or painful.  · You have yellowish-white fluid (pus) coming from the wound.  · You have fluid draining from the wound for more than 1 day.  · You have a bad smell coming from the wound.  · Your wound breaks open.  GET HELP RIGHT AWAY IF:   · You have a rash.  · You have trouble breathing.  · You have chest pain.  · You have a fever.  · You have pain in the shoulders (shoulder strap areas) that is getting worse.  · You feel dizzy or pass out (faint).  · You have severe belly (abdominal) pain.  · You feel sick to your stomach (nauseous) or throw up (vomit) for more than 1 day.     This information is not intended to replace advice given to you by your health care provider. Make sure you discuss any questions you have with your health care provider.     Document  Released: 09/26/2009 Document Revised: 10/08/2014 Document Reviewed: 07/30/2014  [employer] Patient Education ©2016 [employer]      Discharge Instructions    Discharged to home by car with relative. Discharged via wheelchair, hospital escort: Yes.  Special equipment needed: Not Applicable    Be sure to schedule a follow-up appointment with your primary care doctor or any specialists as instructed.     Discharge Plan:   Influenza Vaccine Indication: Patient Refuses    I understand that a diet low in cholesterol, fat, and sodium is recommended for good health. Unless I have been given specific instructions below for another diet, I accept this instruction as my diet prescription.   Other diet: diet as tolerated    Special Instructions: None    · Is patient discharged on Warfarin / Coumadin?   No     Depression / Suicide Risk    As you are discharged from [facility], it is important to learn how to keep safe from harming yourself.    Recognize the warning signs:  · Abrupt changes in personality, positive or negative- including increase in energy   · Giving away possessions  · Change in eating patterns- significant weight changes-  positive or negative  · Change in sleeping patterns- unable to sleep or sleeping all the time   · Unwillingness or inability to communicate  · Depression  · Unusual sadness, discouragement and loneliness  · Talk of wanting to die  · Neglect of personal appearance   · Rebelliousness- reckless behavior  · Withdrawal from people/activities they love  · Confusion- inability to concentrate     If you or a loved one observes any of these behaviors or has concerns about self-harm, here's what you can do:  · Talk about it- your feelings and reasons for harming yourself  · Remove any means that you might use to hurt yourself (examples: pills, rope, extension cords, firearm)  · Get professional help from the community (Mental Health, Substance Abuse, psychological  counseling)  · Do not be alone:Call your Safe Contact- someone whom you trust who will be there for you.  · Call your local CRISIS HOTLINE 419-0878 or 234-159-0213  · Call your local Children's Mobile Crisis Response Team [place] (347) 613-7595 or www.[employer]  · Call the toll free National Suicide Prevention Hotlines   · National Suicide Prevention Lifeline 110-504-YAZX (7396)  · National Hope Line Network 800-SUICIDE (885-0603)

## 2020-02-15 NOTE — OP REPORT
DATE OF SERVICE:  02/15/2020    PREOPERATIVE DIAGNOSES:  Recent choledocholithiasis and cholelithiasis, now   needs a cholecystectomy.    POSTOPERATIVE DIAGNOSES:  Recent choledocholithiasis and cholelithiasis, now   needs a cholecystectomy.    PROCEDURE:  Laparoscopic cholecystectomy.    ANESTHESIA:  General endotracheal.    ANESTHESIOLOGIST:  Jassi Adame MD    SURGEON:  Diego Regalado MD    INDICATIONS:  A 24-year-old female recently found to have choledocholithiasis,   status post a successful ERCP, now needs operative management of her   gallbladder disease.    OPERATIVE FINDINGS:  Dilated ducts, but no acute inflammation of the   gallbladder.    OPERATIVE NOTE:  As follows, the patient was taken to the operating room,   placed in supine position, given general endotracheal anesthesia.  Once   properly anesthetized, was prepped and draped in the usual sterile fashion.  A   0.5% Marcaine with epinephrine was used to anesthetize the skin.    Supraumbilically, a transverse incision was made and carried down through the   skin and subcutaneous tissue.  The towel clamp was used to grab the umbilical   stalk and tented upward.  A Veress needle was inserted without resistance.    Aspiration and flushing revealed no abnormalities.  Pneumoperitoneum was   obtained.  Opening pressure was 3.  Once proper level of pneumoperitoneum was   obtained, an 11 mm trocar port was placed.  General exploration was carried   out.  There was no apparent injury from Veress needle or trocar placement.    Under direct vision, the epigastric 11 mm and 2 lateral 5 mm ports were placed   after the skin had been anesthetized with 0.5% Marcaine with epinephrine.    The gallbladder was grasped at its fundus and infundibulum and tented up over   the liver.  The peritoneal reflection was stripped down to expose the cystic   duct and artery.  The cystic duct was very dilated, but was clearly identified   in relation to the common bile duct, it  was clipped proximally and distally   and divided.  Because of its dilatation part of the back wall was not covered   by the initial clipping, this was sealed with additional Hemoclips.  At the   end of the procedure, this was checked multiple times and there was no bile   leak.  The artery was clipped proximally and distally with proximal end x2 and   divided.  The gallbladder was brought off the liver bed with hook   electrocautery and removed with an EndoCatch bag.  Liver bed was inspected.    There was no active bleeding or bile leaks from the liver bed, duct, or   artery.  Under direct vision, the epigastric port site was closed with an 0   Vicryl Endoclose.  The umbilical port site was then closed with a   figure-of-eight 0 Vicryl suture and skin incisions were all closed with 4-0   Vicryl subcuticular closures.  The patient tolerated the procedure well.    There were no apparent complications.  Lap, sponge and instrument counts were   correct.       ____________________________________     MD ALE Lane / CAROLINE    DD:  02/15/2020 11:14:17  DT:  02/15/2020 11:58:03    D#:  [id number]  Job#:  [id number]

## 2020-02-15 NOTE — ANESTHESIA POSTPROCEDURE EVALUATION
Patient: Gricel Restrepo    Procedure Summary     Date:  02/15/20 Room / Location:   OR  / SURGERY [facility]    Anesthesia Start:  0955 Anesthesia Stop:      Procedure:  CHOLECYSTECTOMY, LAPAROSCOPIC (N/A Abdomen) Diagnosis:  (choledocholithiasis)    Surgeon:  Diego Regalado M.D. Responsible Provider:  Jassi Adame M.D.    Anesthesia Type:  general ASA Status:  1          Final Anesthesia Type: general  Last vitals  BP   Blood Pressure: 101/55    Temp   36.6 °C (97.9 °F)    Pulse   Pulse: (!) 47   Resp   18    SpO2   97 %      Anesthesia Post Evaluation    Patient location during evaluation: PACU  Patient participation: complete - patient participated  Level of consciousness: awake and alert  Pain score: 2    Airway patency: patent  Anesthetic complications: no  Cardiovascular status: hemodynamically stable  Respiratory status: acceptable  Hydration status: euvolemic    PONV: none           Nurse Pain Score: 1 (NPRS)

## 2020-02-16 NOTE — PROGRESS NOTES
Pain controlled. VSS. Pt given discharge instructions, pt verbalizes understanding of discharge instructions, all questions answered. IV DC'd. Pt told to follow up with Dr. Regalado. Meets all criteria for DC.

## 2020-02-18 NOTE — ANESTHESIA POSTPROCEDURE EVALUATION
Patient: Gricel Restrepo    Procedure Summary     Date:  02/14/20 Room / Location:   ENDOSCOPIC ULTRASOUND ROOM / SURGERY [facility]    Anesthesia Start:  1052 Anesthesia Stop:  1141    Procedure:  ERCP, DIAGNOSTIC Diagnosis:       Choledocholithiasis      (choledocholithiasis)    Surgeon:  Nirav Rodriguez M.D. Responsible Provider:  Cayden Lock M.D.    Anesthesia Type:  general ASA Status:  1          Final Anesthesia Type: general  Last vitals - Please see nursing note for vitals, vitals in EPIC    Anesthesia Post Evaluation    Patient location during evaluation: PACU  Patient participation: complete - patient participated  Level of consciousness: awake and alert    Airway patency: patent  Anesthetic complications: no  Cardiovascular status: hemodynamically stable  Respiratory status: acceptable  Hydration status: euvolemic    PONV: none           Nurse Pain Score: 3 (NPRS)

## 2023-12-15 PROBLEM — O44.40 LOW LYING PLACENTA, ANTEPARTUM: Status: ACTIVE | Noted: 2023-12-15

## 2024-03-05 NOTE — PROGRESS NOTES
2 RN skin check - WDL   I spoke with patient discussed labs and recommendations. Patient declines to start statin. She mentioned there is family history and her siblings were on medication. The medication did nothing for them. She will work on diet and exercise

## 2024-03-08 PROBLEM — Z34.83 ENCOUNTER FOR SUPERVISION OF OTHER NORMAL PREGNANCY, THIRD TRIMESTER: Status: ACTIVE | Noted: 2024-03-08

## (undated) DEVICE — TROCAR 5X100 BLADED Z-THREAD - KII (6/BX)

## (undated) DEVICE — GLOVE BIOGEL INDICATOR SZ 8 SURGICAL PF LTX - (50/BX 4BX/CA)

## (undated) DEVICE — TUBE SUCTION YANKAUER  1/4 X 6FT (20EA/CA)"

## (undated) DEVICE — CLIP MED LG INTNL HRZN TI ESCP - (20/BX)

## (undated) DEVICE — DRESSING TRANSPARENT FILM TEGADERM 2.375 X 2.75"  (100EA/BX)"

## (undated) DEVICE — ELECTRODE 850 FOAM ADHESIVE - HYDROGEL RADIOTRNSPRNT (50/PK)

## (undated) DEVICE — SPONGE GAUZESTER. 2X2 4-PL - (2/PK 50PK/BX 30BX/CS)

## (undated) DEVICE — TROCAR Z THREAD 11 X 100 - BLADED (6/BX)

## (undated) DEVICE — SPONGE GAUZE NON-STERILE 4X4 - (2000/CA 10PK/CA)

## (undated) DEVICE — CATHETER IV 20 GA X 1-1/4 ---SURG.& SDS ONLY--- (50EA/BX)

## (undated) DEVICE — TUBE NG SALEM SUMP 16FR (50EA/CA)

## (undated) DEVICE — NEPTUNE 4 PORT MANIFOLD - (20/PK)

## (undated) DEVICE — SUTURE GENERAL

## (undated) DEVICE — MASK ANESTHESIA ADULT  - (100/CA)

## (undated) DEVICE — TUBING SETDISPOS HIGH FLOW II - (10/BX)

## (undated) DEVICE — GOWN WARMING STANDARD FLEX - (30/CA)

## (undated) DEVICE — CANNULA W/SEAL11X100ZTHREAD - (12/BX)

## (undated) DEVICE — DRAPESURG STERI-DRAPE LONG - (10/BX 4BX/CA)

## (undated) DEVICE — BITE BLOCK ADULT 60FR (100EA/CA)

## (undated) DEVICE — PROTECTOR ULNA NERVE - (36PR/CA)

## (undated) DEVICE — SYRINGE SAFETY 5 ML 18 GA X 1-1/2 BLUNT LL (100/BX 4BX/CA)

## (undated) DEVICE — BAG RETRIEVAL 10ML (10EA/BX)

## (undated) DEVICE — GOWN SURGEONS LARGE - (32/CA)

## (undated) DEVICE — CANISTER SUCTION 3000ML MECHANICAL FILTER AUTO SHUTOFF MEDI-VAC NONSTERILE LF DISP  (40EA/CA)

## (undated) DEVICE — STOCKING THIGH HIGH LARGE - SHORT (6PR/BX)

## (undated) DEVICE — LACTATED RINGERS INJ 1000 ML - (14EA/CA 60CA/PF)

## (undated) DEVICE — KIT  I.V. START (100EA/CA)

## (undated) DEVICE — EXTRACTOR PRO XL 9-12 MM ABOVE

## (undated) DEVICE — SET EXTENSION WITH 2 PORTS (48EA/CA) ***PART #2C8610 IS A SUBSTITUTE*****

## (undated) DEVICE — SENSOR SPO2 NEO LNCS ADHESIVE (20/BX) SEE USER NOTES

## (undated) DEVICE — SODIUM CHL IRRIGATION 0.9% 1000ML (12EA/CA)

## (undated) DEVICE — GLOVE BIOGEL ECLIPSE PF LATEX SIZE 8.0  (50PR/BX)

## (undated) DEVICE — SUTURE 0 COATED VICRYL 6-18IN - (12PK/BX)

## (undated) DEVICE — GUIDE JAGWIRE 035 STRAIGHT (2EA/BX)

## (undated) DEVICE — TUBING CLEARLINK DUO-VENT - C-FLO (48EA/CA)

## (undated) DEVICE — WATER IRRIGATION STERILE 1000ML (12EA/CA)

## (undated) DEVICE — CANISTER SUCTION RIGID RED 1500CC (40EA/CA)

## (undated) DEVICE — Device

## (undated) DEVICE — HEAD HOLDER JUNIOR/ADULT

## (undated) DEVICE — SUCTION INSTRUMENT YANKAUER BULBOUS TIP W/O VENT (50EA/CA)

## (undated) DEVICE — SYRINGE SAFETY 3 ML 18 GA X 1 1/2 BLUNT LL (100/BX 8BX/CA)

## (undated) DEVICE — SLEEVE, VASO, THIGH, MED

## (undated) DEVICE — CHLORAPREP 26 ML APPLICATOR - ORANGE TINT(25/CA)

## (undated) DEVICE — SET SUCTION/IRRIGATION WITH DISPOSABLE TIP (6/CA )PART #0250-070-520 IS A SUB

## (undated) DEVICE — SYRINGE SAFETY 10 ML 18 GA X 1 1/2 BLUNT LL (100/BX 4BX/CA)

## (undated) DEVICE — GLOVE, LITE (PAIR)

## (undated) DEVICE — ENDOSTITCH LOAD UNIT 2-0 POLY (12EA/CA)

## (undated) DEVICE — SUTURE 4-0 VICRYL PLUS FS-2 - 27 INCH (36/BX)

## (undated) DEVICE — CANNULA W/SEAL 5X100 Z-THRE - ADED KII (12/BX)

## (undated) DEVICE — SPHINCTEROTOME CLEVER CUT

## (undated) DEVICE — ELECTRODE DUAL RETURN W/ CORD - (50/PK)

## (undated) DEVICE — TUBE CONNECTING SUCTION - CLEAR PLASTIC STERILE 72 IN (50EA/CA)

## (undated) DEVICE — SYRINGE DISP. 50CC LS - (40/BX)

## (undated) DEVICE — KIT ANESTHESIA W/CIRCUIT & 3/LT BAG W/FILTER (20EA/CA)